# Patient Record
Sex: MALE | Employment: FULL TIME | URBAN - METROPOLITAN AREA
[De-identification: names, ages, dates, MRNs, and addresses within clinical notes are randomized per-mention and may not be internally consistent; named-entity substitution may affect disease eponyms.]

---

## 2023-12-11 ENCOUNTER — OFFICE VISIT (OUTPATIENT)
Dept: FAMILY MEDICINE CLINIC | Facility: CLINIC | Age: 53
End: 2023-12-11
Payer: COMMERCIAL

## 2023-12-11 VITALS
BODY MASS INDEX: 29.47 KG/M2 | SYSTOLIC BLOOD PRESSURE: 138 MMHG | HEIGHT: 76 IN | TEMPERATURE: 98 F | HEART RATE: 101 BPM | DIASTOLIC BLOOD PRESSURE: 80 MMHG | WEIGHT: 242 LBS | OXYGEN SATURATION: 97 %

## 2023-12-11 DIAGNOSIS — Z00.00 LABORATORY EXAM ORDERED AS PART OF ROUTINE GENERAL MEDICAL EXAMINATION: ICD-10-CM

## 2023-12-11 DIAGNOSIS — I48.91 ATRIAL FIBRILLATION WITH RVR (HCC): ICD-10-CM

## 2023-12-11 DIAGNOSIS — N52.9 ERECTILE DYSFUNCTION, UNSPECIFIED ERECTILE DYSFUNCTION TYPE: ICD-10-CM

## 2023-12-11 DIAGNOSIS — Z00.00 ANNUAL PHYSICAL EXAM: Primary | ICD-10-CM

## 2023-12-11 DIAGNOSIS — Z12.11 COLON CANCER SCREENING: ICD-10-CM

## 2023-12-11 DIAGNOSIS — R23.9 SKIN CHANGE: ICD-10-CM

## 2023-12-11 DIAGNOSIS — I49.9 IRREGULAR HEART BEAT: ICD-10-CM

## 2023-12-11 LAB
ATRIAL RATE: 241 BPM
Q-T INTERVAL: 370 MS
QRS DURATION: 80 MS
QTC CALCULATION (BEZET): 457 MS
R AXIS: 10 DEGREES
T AXIS: 30 DEGREES
VENTRICULAR RATE: 92 BPM

## 2023-12-11 RX ORDER — SILDENAFIL 100 MG/1
100 TABLET, FILM COATED ORAL
Qty: 30 TABLET | Refills: 1 | Status: SHIPPED | OUTPATIENT
Start: 2023-12-11

## 2023-12-14 ENCOUNTER — APPOINTMENT (OUTPATIENT)
Dept: LAB | Age: 53
End: 2023-12-14
Attending: FAMILY MEDICINE
Payer: COMMERCIAL

## 2023-12-27 ENCOUNTER — LAB ENCOUNTER (OUTPATIENT)
Dept: LAB | Age: 53
End: 2023-12-27
Attending: FAMILY MEDICINE
Payer: COMMERCIAL

## 2023-12-27 DIAGNOSIS — Z00.00 LABORATORY EXAM ORDERED AS PART OF ROUTINE GENERAL MEDICAL EXAMINATION: ICD-10-CM

## 2023-12-27 LAB
ALBUMIN SERPL-MCNC: 3.9 G/DL (ref 3.4–5)
ALBUMIN/GLOB SERPL: 1.1 {RATIO} (ref 1–2)
ALP LIVER SERPL-CCNC: 57 U/L
ALT SERPL-CCNC: 34 U/L
ANION GAP SERPL CALC-SCNC: 4 MMOL/L (ref 0–18)
AST SERPL-CCNC: 23 U/L (ref 15–37)
BILIRUB SERPL-MCNC: 0.4 MG/DL (ref 0.1–2)
BUN BLD-MCNC: 17 MG/DL (ref 9–23)
CALCIUM BLD-MCNC: 8.8 MG/DL (ref 8.5–10.1)
CHLORIDE SERPL-SCNC: 109 MMOL/L (ref 98–112)
CHOLEST SERPL-MCNC: 171 MG/DL (ref ?–200)
CO2 SERPL-SCNC: 26 MMOL/L (ref 21–32)
COMPLEXED PSA SERPL-MCNC: 3.39 NG/ML (ref ?–4)
CREAT BLD-MCNC: 1.36 MG/DL
EGFRCR SERPLBLD CKD-EPI 2021: 62 ML/MIN/1.73M2 (ref 60–?)
ERYTHROCYTE [DISTWIDTH] IN BLOOD BY AUTOMATED COUNT: 12.4 %
EST. AVERAGE GLUCOSE BLD GHB EST-MCNC: 111 MG/DL (ref 68–126)
FASTING PATIENT LIPID ANSWER: YES
FASTING STATUS PATIENT QL REPORTED: YES
GLOBULIN PLAS-MCNC: 3.5 G/DL (ref 2.8–4.4)
GLUCOSE BLD-MCNC: 102 MG/DL (ref 70–99)
HBA1C MFR BLD: 5.5 % (ref ?–5.7)
HCT VFR BLD AUTO: 47.3 %
HDLC SERPL-MCNC: 39 MG/DL (ref 40–59)
HGB BLD-MCNC: 16.5 G/DL
LDLC SERPL CALC-MCNC: 101 MG/DL (ref ?–100)
MCH RBC QN AUTO: 30.6 PG (ref 26–34)
MCHC RBC AUTO-ENTMCNC: 34.9 G/DL (ref 31–37)
MCV RBC AUTO: 87.8 FL
NONHDLC SERPL-MCNC: 132 MG/DL (ref ?–130)
OSMOLALITY SERPL CALC.SUM OF ELEC: 290 MOSM/KG (ref 275–295)
PLATELET # BLD AUTO: 268 10(3)UL (ref 150–450)
POTASSIUM SERPL-SCNC: 4.1 MMOL/L (ref 3.5–5.1)
PROT SERPL-MCNC: 7.4 G/DL (ref 6.4–8.2)
RBC # BLD AUTO: 5.39 X10(6)UL
SODIUM SERPL-SCNC: 139 MMOL/L (ref 136–145)
TRIGL SERPL-MCNC: 180 MG/DL (ref 30–149)
TSI SER-ACNC: 2.62 MIU/ML (ref 0.36–3.74)
VLDLC SERPL CALC-MCNC: 30 MG/DL (ref 0–30)
WBC # BLD AUTO: 7 X10(3) UL (ref 4–11)

## 2023-12-27 PROCEDURE — 80061 LIPID PANEL: CPT

## 2023-12-27 PROCEDURE — 84443 ASSAY THYROID STIM HORMONE: CPT

## 2023-12-27 PROCEDURE — 80053 COMPREHEN METABOLIC PANEL: CPT

## 2023-12-27 PROCEDURE — 36415 COLL VENOUS BLD VENIPUNCTURE: CPT

## 2023-12-27 PROCEDURE — 85027 COMPLETE CBC AUTOMATED: CPT

## 2023-12-27 PROCEDURE — 83036 HEMOGLOBIN GLYCOSYLATED A1C: CPT

## 2024-02-07 RX ORDER — FLECAINIDE ACETATE 100 MG/1
100 TABLET ORAL 2 TIMES DAILY
COMMUNITY

## 2024-02-07 RX ORDER — METOPROLOL SUCCINATE 50 MG/1
50 TABLET, EXTENDED RELEASE ORAL DAILY
Status: ON HOLD | COMMUNITY
End: 2024-02-13

## 2024-02-12 NOTE — PAT NURSING NOTE
Preprocedure Instructions     Pre-Procedure Instructions: Encouraged to check in electronically via [employer]     Visitor Instructions     Adult Patients: 2 Adult Care Partners can accompany the patient day of procedure. 2 Care Partners may visit [id number] during inpatient stay        PreOp Instructions     You are scheduled for: a Cardiac Procedure- Cardioversion with Dr. Umanzor     Date of Procedure: 02/13/24 Tuesday     Diet Instructions: Do not eat or drink anything after midnight     Medications: Medications you are allowed to take can be taken with a sip of water the morning of your procedure     Medications to Stop: Hold herbal supplements and vitamins on day of procedure     Other Medications: Do not take Viagra within 24 hours of procedure     Arrival Time: You will receive a call the afternoon before your procedure after 3 pm on what time you should arrive the day of your procedure    Driving After Procedure: If sedation is given, you WILL NOT be able to drive home. You will need a responsible adult  to drive you home., Cannot take uber or cab unless approved by physician     Discharge Teaching: Your nurse will give you specific instructions before discharge, Most people can resume normal activities in 2-3 days, Any questions, please call the physician's office      parking is available starting at 6 am or park in the [place] parking garage at [facility]. Check in at the [facility] reception desk. Our  will be there to check you in for your procedure. Please bring your insurance cards and ID with you.                                                                                                                                      Please DO NOT respond to this message, the inbasket is not monitored for messages. For any questions, please call the physician's office.

## 2024-02-13 ENCOUNTER — HOSPITAL ENCOUNTER (OUTPATIENT)
Dept: INTERVENTIONAL RADIOLOGY/VASCULAR | Facility: HOSPITAL | Age: 54
Discharge: HOME | End: 2024-02-13
Attending: INTERNAL MEDICINE | Admitting: INTERNAL MEDICINE
Payer: COMMERCIAL

## 2024-02-13 VITALS
HEIGHT: 76 IN | OXYGEN SATURATION: 95 % | BODY MASS INDEX: 29.22 KG/M2 | WEIGHT: 240 LBS | DIASTOLIC BLOOD PRESSURE: 90 MMHG | RESPIRATION RATE: 17 BRPM | TEMPERATURE: 98 F | HEART RATE: 53 BPM | SYSTOLIC BLOOD PRESSURE: 128 MMHG

## 2024-02-13 DIAGNOSIS — I48.91 A-FIB (HCC): ICD-10-CM

## 2024-02-13 PROCEDURE — 5A2204Z RESTORATION OF CARDIAC RHYTHM, SINGLE: ICD-10-PCS | Performed by: INTERNAL MEDICINE

## 2024-02-13 PROCEDURE — 92960 CARDIOVERSION ELECTRIC EXT: CPT | Performed by: INTERNAL MEDICINE

## 2024-02-13 PROCEDURE — 93005 ELECTROCARDIOGRAM TRACING: CPT

## 2024-02-13 PROCEDURE — 93010 ELECTROCARDIOGRAM REPORT: CPT | Performed by: INTERNAL MEDICINE

## 2024-02-13 RX ORDER — METHOHEXITAL IN WATER/PF 100MG/10ML
100 SYRINGE (ML) INTRAVENOUS ONCE
Status: DISCONTINUED | OUTPATIENT
Start: 2024-02-13 | End: 2024-02-13 | Stop reason: HOSPADM

## 2024-02-13 RX ORDER — SODIUM CHLORIDE 9 MG/ML
INJECTION, SOLUTION INTRAVENOUS CONTINUOUS
Status: DISCONTINUED | OUTPATIENT
Start: 2024-02-13 | End: 2024-02-13

## 2024-02-13 RX ORDER — METHOHEXITAL IN WATER/PF 100MG/10ML
SYRINGE (ML) INTRAVENOUS
Status: COMPLETED
Start: 2024-02-13 | End: 2024-02-13

## 2024-02-13 RX ORDER — METOPROLOL SUCCINATE 50 MG/1
25 TABLET, EXTENDED RELEASE ORAL DAILY
Qty: 30 TABLET | Refills: 0 | Status: SHIPPED | OUTPATIENT
Start: 2024-02-13

## 2024-02-13 RX ADMIN — SODIUM CHLORIDE: 9 INJECTION, SOLUTION INTRAVENOUS at 11:00:00

## 2024-02-13 RX ADMIN — METHOHEXITAL IN WATER/PF 60 MG: 100MG/10ML SYRINGE (ML) INTRAVENOUS at 11:55:00

## 2024-02-13 NOTE — PROGRESS NOTES
Successful cardioversion at bedside with Dr Umanzor. Pt tolerated procedure well. See bedside sedation record. Pt converted to SB  confirmed by EKG. Neuro intact, RICHARDS well. Pts wife at bedside.     12:45: Pt did not want anything to eat or drink. IV removed with catheter intact. Reviewed discharge instructions with pt and wife, verbalizes understanding. Home via w/c in stable condition without c/o. Pts wife is the .

## 2024-02-13 NOTE — H&P
[facility]  H&P    Dann Whitt Patient Status:  Outpatient    10/31/1970 MRN [id number]   Location [facility] INTERVENTIONAL SUITES Attending Vlad Umanzor MD   Hosp Day # 0 PCP Chris Pollard MD     Reason for Admission:  Pt presents for cardioversion.    Assessment/Plan:  Patient Active Problem List   Diagnosis    Pneumonia, organism unspecified(486)    Unspecified sinusitis (chronic)    Sciatica       Pt presents for cardioversion. Pt has been therapeutic anticoagulation for the appropriate amount of time, uninterrupted. Discussed risks, benefits and alternatives to sedation and cardioversion.     History of Present Illness:  Dann Whitt is a a(n) 53 year old male. Presents for cardioversion.    History:  Past Medical History:   Diagnosis Date    Arrhythmia      Past Surgical History:   Procedure Laterality Date    ANESTH,KNEE JOINT,CLOSED PROCEDURE Right 2003    bursa removed    HAND/FINGER SURGERY UNLISTED  1989    left bone graft (pinky finger)    OTHER SURGICAL HISTORY  2001    corneal lasik     Family History   Problem Relation Age of Onset    Diabetes Mother     Colon Cancer Father     No Known Problems Sister     No Known Problems Brother     No Known Problems Brother       reports that he has quit smoking. His smoking use included cigarettes. He has never used smokeless tobacco. He reports current alcohol use. He reports current drug use. Drug: Cannabis.    Allergies:  No Active Allergies    Medications:    Current Facility-Administered Medications:     sodium chloride 0.9% infusion, , Intravenous, Continuous    methohexital (BrevITAL) 100 mg/10mL IV syringe 100 mg, 100 mg, Intravenous, Once    Review of Systems:  All systems were reviewed and are negative except as described above in HPI.    Physical Exam:  Blood pressure (!) 155/107, pulse 70, temperature 98.2 °F (36.8 °C), temperature source Temporal, resp. rate 14, height 76\", weight  240 lb (108.9 kg), SpO2 96%.  Temp (24hrs), Av.2 °F (36.8 °C), Min:98.2 °F (36.8 °C), Max:98.2 °F (36.8 °C)    Wt Readings from Last 3 Encounters:   24 240 lb (108.9 kg)   23 242 lb (109.8 kg)         General: Alert and oriented in no apparent distress.  HEENT: No focal deficits.  Neck: No JVD, carotids 2+ no bruits.  Cardiac: Regular rate and rhythm, S1, S2 normal, no murmur, rub or gallop.  Lungs: Clear without wheezes, rales, rhonchi or dullness.  Normal excursions and effort.  Abdomen: Soft, non-tender.   Extremities: Without clubbing, cyanosis or edema.  Peripheral pulses are 2+.  Neurologic: Alert and oriented, normal affect.  Skin: Warm and dry.     Laboratories and Data:  Diagnostics:    Labs:        No results found for: \"PT\", \"INR\"      Vlad Umanzor MD  2024  11:52 AM    Vlad Umanzor MD  [facility]  Cardiac Electrophysiolgy

## 2024-02-13 NOTE — DISCHARGE INSTRUCTIONS
HOME CARE INSTRUCTIONS FOLLOWING CARDIOVERSION OR ICD EVALUATION    ACTIVITY:  - DO NOT drive after the procedure. You may resume driving after 24 hours.   - DO NOT operate any heavy machinery for 24 hours (Including kitchen appliances or power tools)   - Avoid drinking alcohol for 24 hours.  - Rest today and resume your normal activities tomorrow.    WHAT IS NORMAL:   -Your skin may be red where the patches were placed.  -The redness may last 2-3 days and feel like \"Sunburn\"  -You may treat the area with an over-the- counter cream that would be used for sunburned skin.    SPECIAL INSTRUCTIONS:  -IF you are taking the medication Xarelto or Coumadin or other blood thinning medication, it is very important that you continue taking until your follow up appointment with you physician.    OTHER:  -You may resume your present diet and medications unless otherwise specified by you physician.  -A list of medications was provided to you at discharge.  - Decrease Metoprolol to 25mg a day.   ~ Follow up with Dr Umanzor on 3/18 at 2:00PM

## 2024-02-13 NOTE — PROCEDURES
PROCEDURE(S) PERFORMED:    1.    Cardioversion.  2.     Sedation 60    :  Vlad Beckham MD     ANESTHESIA:  IV sedation.     PRE-Op  Persistent atrial fibrillation.  POST-Op Sinus rhythm  COMPLICATIONS:  None.     METHODS:  The patient was brought to the outpatient cardiac telemetry unit in a fasting and nonsedated state after providing informed consent.  IV sedation was administered during continuous ECG, pulse oximeter and noninvasive hemodynamic monitoring.  After administering a total of 60 mg of IV brevital in intermittent boluses, the desired level of sedation was achieved.  A single 360-joule synchronized biphasic shock was delivered with successful conversion to sinus rhythm. The patient remained on telemetry until she was fully recovered.  There were no complications.     CONCLUSIONS:  1.    Successful cardioversion.     ________________________________  SEAMUS BECKHAM MD

## 2024-02-14 LAB
ATRIAL RATE: 56 BPM
P AXIS: 64 DEGREES
P-R INTERVAL: 174 MS
Q-T INTERVAL: 462 MS
QRS DURATION: 84 MS
QTC CALCULATION (BEZET): 445 MS
R AXIS: 49 DEGREES
T AXIS: 61 DEGREES
VENTRICULAR RATE: 56 BPM

## 2024-04-18 RX ORDER — SOTALOL HYDROCHLORIDE 80 MG/1
80 TABLET ORAL 2 TIMES DAILY
COMMUNITY
End: 2024-04-23

## 2024-04-22 NOTE — PAT NURSING NOTE
Patient denies missing any doses of Xarelto in the last month.    PreOp Instructions     You are scheduled for: a Cardiac Procedure     Date of Procedure: 04/23/24 Tuesday     Diet Instructions: Do not eat or drink anything after midnight     Medications: Medications you are allowed to take can be taken with a sip of water the morning of your procedure     Medications to Stop: Hold herbal supplements and vitamins on day of procedure     Other Medications: Hold/do NOT take Viagra/Sildenafil within 24 hours of procedure.      Do NOT miss any doses of Xarelto prior to Cardioversion, please notify office for any missed doses.     Arrival Time: 11:00 am    Driving After Procedure: If sedation is given, you WILL NOT be able to drive home. You will need a responsible adult  to drive you home., Cannot take uber or cab unless approved by physician     Discharge Teaching: Your nurse will give you specific instructions before discharge, Most people can resume normal activities in 2-3 days, Any questions, please call the physician's office     [employer] parking is available starting at 6 am or park in the [place] parking garage at [facility]. Check in at the [facility] reception desk. Our  will be there to check you in for your procedure. Please bring your insurance cards and ID with you.                                                                                                                                      Please DO NOT respond to this message, the inbasket is not monitored for messages. For any questions, please call the physician's office.

## 2024-04-23 ENCOUNTER — TELEPHONE (OUTPATIENT)
Dept: FAMILY MEDICINE CLINIC | Facility: CLINIC | Age: 54
End: 2024-04-23

## 2024-04-23 ENCOUNTER — HOSPITAL ENCOUNTER (OUTPATIENT)
Dept: INTERVENTIONAL RADIOLOGY/VASCULAR | Facility: HOSPITAL | Age: 54
Discharge: HOME | End: 2024-04-23
Attending: INTERNAL MEDICINE | Admitting: INTERNAL MEDICINE
Payer: COMMERCIAL

## 2024-04-23 VITALS
HEART RATE: 61 BPM | TEMPERATURE: 97 F | HEIGHT: 76 IN | DIASTOLIC BLOOD PRESSURE: 87 MMHG | RESPIRATION RATE: 18 BRPM | OXYGEN SATURATION: 95 % | WEIGHT: 240 LBS | SYSTOLIC BLOOD PRESSURE: 131 MMHG | BODY MASS INDEX: 29.22 KG/M2

## 2024-04-23 DIAGNOSIS — I48.91 A-FIB (HCC): ICD-10-CM

## 2024-04-23 LAB
ATRIAL RATE: 234 BPM
Q-T INTERVAL: 422 MS
QRS DURATION: 76 MS
QTC CALCULATION (BEZET): 486 MS
R AXIS: 19 DEGREES
T AXIS: 25 DEGREES
VENTRICULAR RATE: 80 BPM

## 2024-04-23 PROCEDURE — 93010 ELECTROCARDIOGRAM REPORT: CPT | Performed by: INTERNAL MEDICINE

## 2024-04-23 PROCEDURE — 93005 ELECTROCARDIOGRAM TRACING: CPT

## 2024-04-23 PROCEDURE — 92960 CARDIOVERSION ELECTRIC EXT: CPT | Performed by: INTERNAL MEDICINE

## 2024-04-23 PROCEDURE — 5A2204Z RESTORATION OF CARDIAC RHYTHM, SINGLE: ICD-10-PCS | Performed by: INTERNAL MEDICINE

## 2024-04-23 RX ORDER — METHOHEXITAL IN WATER/PF 100MG/10ML
100 SYRINGE (ML) INTRAVENOUS ONCE
Status: COMPLETED | OUTPATIENT
Start: 2024-04-23 | End: 2024-04-23

## 2024-04-23 RX ORDER — METHOHEXITAL IN WATER/PF 100MG/10ML
SYRINGE (ML) INTRAVENOUS
Status: COMPLETED
Start: 2024-04-23 | End: 2024-04-23

## 2024-04-23 RX ORDER — SODIUM CHLORIDE 9 MG/ML
INJECTION, SOLUTION INTRAVENOUS CONTINUOUS
Status: DISCONTINUED | OUTPATIENT
Start: 2024-04-23 | End: 2024-04-23

## 2024-04-23 RX ADMIN — METHOHEXITAL IN WATER/PF 70 MG: 100MG/10ML SYRINGE (ML) INTRAVENOUS at 12:00:00

## 2024-04-23 NOTE — DISCHARGE INSTRUCTIONS
HOME CARE INSTRUCTIONS FOLLOWING CARDIOVERSION OR ICD EVALUATION      Activity:  - DO NOT drive after the procedure. You may resume driving after 24 hours.  - DO NOT operate any machinery (including kitchen appliances or power tools).  - Avoid drinking alcohol for 24 hours.  - You may resume your normal activity after 24 hours.    What is Normal:  - Your skin may be red where the patches were placed.  - The redness may last 2 to 3 days and feel like a \"sunburn\".  - You may treat the area with an over-the-counter cream that is used for sunburned skin.    Special Instructions:  - If you were taking the medication Eliquis, Xarelto, Pradaxa or Coumadin, it is very important to continue taking it until your follow-up appointment with your physician.    When you should NOTIFY YOUR PHYSICIAN:  - If you feel that you have returned to an irregular rhythm  - If you have an ICD, any time your ICD device fires    Other:  - You may resume your present diet, unless otherwise specified by your physician.  - You may resume all of your medications as prescribed, unless otherwise directed by your physician.  - A list of your medications was provided to you at discharge.  - Please call your physician's office for a follow-up appointment. You should be seen in 2 weeks.

## 2024-04-23 NOTE — H&P
[facility]  H&P    Dann Whitt Patient Status:  Outpatient    10/31/1970 MRN [id number]   Location [facility] INTERVENTIONAL SUITES Attending Vlad Umanzor MD   Hosp Day # 0 PCP Chris Pollard MD     Reason for Admission:  Pt presents for cardioversion.    Assessment/Plan:  Patient Active Problem List   Diagnosis    Pneumonia, organism unspecified(486)    Unspecified sinusitis (chronic)    Sciatica       Pt presents for cardioversion. Pt has been therapeutic anticoagulation for the appropriate amount of time, uninterrupted. Discussed risks, benefits and alternatives to sedation and cardioversion.     History of Present Illness:  Dann Whitt is a a(n) 53 year old male. Presents for cardioversion.    History:  Past Medical History:    Arrhythmia     Past Surgical History:   Procedure Laterality Date    Anesth,knee joint,closed procedure Right     bursa removed    Hand/finger surgery unlisted  1989    left bone graft (pinky finger)    Other surgical history  2001    corneal lasik     Family History   Problem Relation Age of Onset    Diabetes Mother     Colon Cancer Father     No Known Problems Sister     No Known Problems Brother     No Known Problems Brother       reports that he has quit smoking. His smoking use included cigarettes. He has never used smokeless tobacco. He reports current alcohol use. He reports current drug use. Drug: Cannabis.    Allergies:  No Active Allergies    Medications:    Current Facility-Administered Medications:     sodium chloride 0.9% infusion, , Intravenous, Continuous    methohexital (BrevITAL) 100 mg/10mL IV syringe 100 mg, 100 mg, Intravenous, Once    Review of Systems:  All systems were reviewed and are negative except as described above in HPI.    Physical Exam:  Blood pressure 134/89, pulse 73, temperature 97 °F (36.1 °C), temperature source Temporal, resp. rate 21, height 76\", weight 240 lb (108.9 kg), SpO2  96%.  Temp (24hrs), Av °F (36.1 °C), Min:97 °F (36.1 °C), Max:97 °F (36.1 °C)    Wt Readings from Last 3 Encounters:   24 240 lb (108.9 kg)   24 240 lb (108.9 kg)   23 242 lb (109.8 kg)         General: Alert and oriented in no apparent distress.  HEENT: No focal deficits.  Neck: No JVD, carotids 2+ no bruits.  Cardiac: Regular rate and rhythm, S1, S2 normal, no murmur, rub or gallop.  Lungs: Clear without wheezes, rales, rhonchi or dullness.  Normal excursions and effort.  Abdomen: Soft, non-tender.   Extremities: Without clubbing, cyanosis or edema.  Peripheral pulses are 2+.  Neurologic: Alert and oriented, normal affect.  Skin: Warm and dry.     Laboratories and Data:  Diagnostics:    Labs:        No results found for: \"PT\", \"INR\"      Vlad Umanzor MD  2024  12:12 PM    Vlad Umanzor MD  [facility]  Cardiac Electrophysiolgy

## 2024-04-23 NOTE — PROCEDURES
PROCEDURE(S) PERFORMED:    1.    Cardioversion.  2.     Sedation     :  Vlad Beckham MD     ANESTHESIA:  IV sedation.     PRE-Op  Persistent atrial fibrillation.  POST-Op Sinus rhythm  COMPLICATIONS:  None.     METHODS:  The patient was brought to the outpatient cardiac telemetry unit in a fasting and nonsedated state after providing informed consent.  IV sedation was administered during continuous ECG, pulse oximeter and noninvasive hemodynamic monitoring.  After administering a total of 70 mg of IV brevital in intermittent boluses, the desired level of sedation was achieved.  A single 360-joule synchronized biphasic shock was delivered with successful conversion to sinus rhythm. The patient remained on telemetry until she was fully recovered.  There were no complications.     CONCLUSIONS:  1.    Successful cardioversion.     ________________________________  SEAMUS BECKHAM MD

## 2024-04-23 NOTE — PROGRESS NOTES
Pt post cardioversion  TO at 1208- after adequate sedation per Dr. Umanzor, attempt to cardiovert x 2 at 360j. Pt converted to NSR for a short time then converted back to afib. Vss. Pt tolerated well.    Pt awake.     Recovery complete per protocol. Vss. Discharge instructions reviewed, iv dc;'d and pt discharged home with wife driving.

## 2024-04-24 NOTE — TELEPHONE ENCOUNTER
Pt bite tongue yesterday, bleeding has stopped but area swollen and painful. Small area on tongue has a flap  present but not bleeding.  Advised salt water gargle, soft foods, sucking  on ice, Tylenol and continue to monitor.

## 2024-06-04 ENCOUNTER — ORDER TRANSCRIPTION (OUTPATIENT)
Dept: ADMINISTRATIVE | Facility: HOSPITAL | Age: 54
End: 2024-06-04

## 2024-06-04 DIAGNOSIS — I48.91 ATRIAL FIBRILLATION WITH RVR (HCC): Primary | ICD-10-CM

## 2024-06-05 ENCOUNTER — TELEPHONE (OUTPATIENT)
Dept: CARDIOLOGY UNIT | Facility: HOSPITAL | Age: 54
End: 2024-06-05

## 2024-06-05 NOTE — PROGRESS NOTES
Reviewed [facility] stress echo with Dr. ZARIA Wei, no further work up indicated for cardiac clearance.  Amanda Avina RN  Clinical Coordinator  CV [facility]

## 2024-06-11 ENCOUNTER — HOSPITAL ENCOUNTER (OUTPATIENT)
Dept: GENERAL RADIOLOGY | Age: 54
Discharge: HOME | End: 2024-06-11
Attending: SURGERY
Payer: COMMERCIAL

## 2024-06-11 DIAGNOSIS — Z01.818 PRE-OP TESTING: ICD-10-CM

## 2024-06-11 DIAGNOSIS — I48.19 PERSISTENT ATRIAL FIBRILLATION (HCC): ICD-10-CM

## 2024-06-11 PROCEDURE — 71046 X-RAY EXAM CHEST 2 VIEWS: CPT | Performed by: SURGERY

## 2024-06-14 NOTE — IMAGING NOTE
0843- Pt called and LVM instructing to arrive 15 minutes prior to CTA gated study on 6/18/24, park in the [facility] parking garage, eat a light breakfast/lunch, hydrate, hold caffeine/decaf/chocolate for 12 hours prior, and take all medications as prescribed.  Provided radiology RN callback number if pt has any questions.

## 2024-06-18 ENCOUNTER — HOSPITAL ENCOUNTER (OUTPATIENT)
Dept: CT IMAGING | Facility: HOSPITAL | Age: 54
Discharge: HOME | End: 2024-06-18
Attending: INTERNAL MEDICINE

## 2024-06-18 ENCOUNTER — LAB ENCOUNTER (OUTPATIENT)
Dept: LAB | Facility: HOSPITAL | Age: 54
End: 2024-06-18
Attending: SURGERY

## 2024-06-18 ENCOUNTER — EKG ENCOUNTER (OUTPATIENT)
Dept: LAB | Facility: HOSPITAL | Age: 54
End: 2024-06-18
Attending: SURGERY

## 2024-06-18 VITALS — HEART RATE: 89 BPM | SYSTOLIC BLOOD PRESSURE: 125 MMHG | DIASTOLIC BLOOD PRESSURE: 87 MMHG

## 2024-06-18 DIAGNOSIS — I48.91 ATRIAL FIBRILLATION WITH RVR (HCC): ICD-10-CM

## 2024-06-18 DIAGNOSIS — Z01.818 PRE-OP TESTING: ICD-10-CM

## 2024-06-18 DIAGNOSIS — I48.19 PERSISTENT ATRIAL FIBRILLATION (HCC): ICD-10-CM

## 2024-06-18 DIAGNOSIS — Z91.89 AT RISK FOR BLEEDING: ICD-10-CM

## 2024-06-18 LAB
ALBUMIN SERPL-MCNC: 3.8 G/DL (ref 3.4–5)
ALBUMIN/GLOB SERPL: 1.1 {RATIO} (ref 1–2)
ALP LIVER SERPL-CCNC: 52 U/L
ALT SERPL-CCNC: 26 U/L
ANION GAP SERPL CALC-SCNC: 5 MMOL/L (ref 0–18)
ANTIBODY SCREEN: NEGATIVE
APTT PPP: 41.3 SECONDS (ref 23–36)
AST SERPL-CCNC: 19 U/L (ref 15–37)
ATRIAL RATE: 288 BPM
BASOPHILS # BLD AUTO: 0.07 X10(3) UL (ref 0–0.2)
BASOPHILS NFR BLD AUTO: 1.2 %
BILIRUB SERPL-MCNC: 0.5 MG/DL (ref 0.1–2)
BILIRUB UR QL STRIP.AUTO: NEGATIVE
BUN BLD-MCNC: 16 MG/DL (ref 9–23)
CALCIUM BLD-MCNC: 8.6 MG/DL (ref 8.5–10.1)
CHLORIDE SERPL-SCNC: 115 MMOL/L (ref 98–112)
CLARITY UR REFRACT.AUTO: CLEAR
CO2 SERPL-SCNC: 22 MMOL/L (ref 21–32)
COLOR UR AUTO: YELLOW
CREAT BLD-MCNC: 1.06 MG/DL
CREAT BLD-MCNC: 1.1 MG/DL
EGFRCR SERPLBLD CKD-EPI 2021: 80 ML/MIN/1.73M2 (ref 60–?)
EGFRCR SERPLBLD CKD-EPI 2021: 83 ML/MIN/1.73M2 (ref 60–?)
EOSINOPHIL # BLD AUTO: 0.13 X10(3) UL (ref 0–0.7)
EOSINOPHIL NFR BLD AUTO: 2.1 %
ERYTHROCYTE [DISTWIDTH] IN BLOOD BY AUTOMATED COUNT: 12.5 %
EST. AVERAGE GLUCOSE BLD GHB EST-MCNC: 105 MG/DL (ref 68–126)
FASTING STATUS PATIENT QL REPORTED: YES
GLOBULIN PLAS-MCNC: 3.4 G/DL (ref 2.8–4.4)
GLUCOSE BLD-MCNC: 90 MG/DL (ref 70–99)
GLUCOSE UR STRIP.AUTO-MCNC: NORMAL MG/DL
HBA1C MFR BLD: 5.3 % (ref ?–5.7)
HCT VFR BLD AUTO: 43.3 %
HGB BLD-MCNC: 15.3 G/DL
IMM GRANULOCYTES # BLD AUTO: 0.03 X10(3) UL (ref 0–1)
IMM GRANULOCYTES NFR BLD: 0.5 %
INR BLD: 1.57 (ref 0.8–1.2)
KETONES UR STRIP.AUTO-MCNC: NEGATIVE MG/DL
LEUKOCYTE ESTERASE UR QL STRIP.AUTO: NEGATIVE
LYMPHOCYTES # BLD AUTO: 1.71 X10(3) UL (ref 1–4)
LYMPHOCYTES NFR BLD AUTO: 28.2 %
MCH RBC QN AUTO: 30.7 PG (ref 26–34)
MCHC RBC AUTO-ENTMCNC: 35.3 G/DL (ref 31–37)
MCV RBC AUTO: 86.9 FL
MONOCYTES # BLD AUTO: 0.62 X10(3) UL (ref 0.1–1)
MONOCYTES NFR BLD AUTO: 10.2 %
NEUTROPHILS # BLD AUTO: 3.5 X10 (3) UL (ref 1.5–7.7)
NEUTROPHILS # BLD AUTO: 3.5 X10(3) UL (ref 1.5–7.7)
NEUTROPHILS NFR BLD AUTO: 57.8 %
NITRITE UR QL STRIP.AUTO: NEGATIVE
OSMOLALITY SERPL CALC.SUM OF ELEC: 295 MOSM/KG (ref 275–295)
PH UR STRIP.AUTO: 5.5 [PH] (ref 5–8)
PLATELET # BLD AUTO: 261 10(3)UL (ref 150–450)
POTASSIUM SERPL-SCNC: 4.1 MMOL/L (ref 3.5–5.1)
PROT SERPL-MCNC: 7.2 G/DL (ref 6.4–8.2)
PROTHROMBIN TIME: 18.9 SECONDS (ref 11.6–14.8)
Q-T INTERVAL: 366 MS
QRS DURATION: 78 MS
QTC CALCULATION (BEZET): 467 MS
R AXIS: 12 DEGREES
RBC # BLD AUTO: 4.98 X10(6)UL
RBC UR QL AUTO: NEGATIVE
RH BLOOD TYPE: POSITIVE
SODIUM SERPL-SCNC: 142 MMOL/L (ref 136–145)
SP GR UR STRIP.AUTO: 1.03 (ref 1–1.03)
T AXIS: 13 DEGREES
UROBILINOGEN UR STRIP.AUTO-MCNC: NORMAL MG/DL
VENTRICULAR RATE: 98 BPM
WBC # BLD AUTO: 6.1 X10(3) UL (ref 4–11)

## 2024-06-18 PROCEDURE — 86901 BLOOD TYPING SEROLOGIC RH(D): CPT

## 2024-06-18 PROCEDURE — 36415 COLL VENOUS BLD VENIPUNCTURE: CPT

## 2024-06-18 PROCEDURE — 81003 URINALYSIS AUTO W/O SCOPE: CPT

## 2024-06-18 PROCEDURE — 86900 BLOOD TYPING SEROLOGIC ABO: CPT

## 2024-06-18 PROCEDURE — 83036 HEMOGLOBIN GLYCOSYLATED A1C: CPT

## 2024-06-18 PROCEDURE — 80053 COMPREHEN METABOLIC PANEL: CPT

## 2024-06-18 PROCEDURE — 85610 PROTHROMBIN TIME: CPT

## 2024-06-18 PROCEDURE — 82565 ASSAY OF CREATININE: CPT

## 2024-06-18 PROCEDURE — 85025 COMPLETE CBC W/AUTO DIFF WBC: CPT

## 2024-06-18 PROCEDURE — 75572 CT HRT W/3D IMAGE: CPT | Performed by: INTERNAL MEDICINE

## 2024-06-18 PROCEDURE — 86850 RBC ANTIBODY SCREEN: CPT

## 2024-06-18 PROCEDURE — 85730 THROMBOPLASTIN TIME PARTIAL: CPT

## 2024-06-18 PROCEDURE — 93010 ELECTROCARDIOGRAM REPORT: CPT | Performed by: INTERNAL MEDICINE

## 2024-06-18 PROCEDURE — 93005 ELECTROCARDIOGRAM TRACING: CPT

## 2024-06-18 NOTE — IMAGING NOTE
Pt scheduled for CT gated pulmonary vein  Denies any allergies to contrast.  Scanned in CT room # 4  HR 89 during scan at 1131  0.9  ml given  IV contrast 70 ml given  Tolerated exam well. Instructed to drink water.

## 2024-07-02 NOTE — PAT NURSING NOTE
PAT nursing note: denies taking ASA, ACEs, ARBs, diabetic or weight loss medications; no PPM, ICD or nerve/spinal cord stimulator; patient/spouse verbalized understanding of all instructions.      Pre-Surgical Instructions for 7/9/24 with Dr. Wei       Visitor Instructions    -2 visitors are welcome to accompany you the day of surgery.   -Visiting hours are 7:00 am-8:00 pm.      Pre-Op Instructions    Scheduled Surgery: You are scheduled for Cardiac Surgery with Dr. Wei      Date of Procedure: Tuesday, 07/09/24      TENTATIVE time of arrival: 05:30 am     -This is going to be a tentative time of arrival for surgery.   -We will call you the buisness day prior to your surgery in the late afternoon to reconfirm your arrival.   -Please check your voicemail for a message.    -Park in the [place] parking garage at [facility].    -Check in at the [facility] reception desk in the [facility].  -Our  will be there to check you in for your procedure.   -[email or web address] parking is available starting at 6:00 am.    -Leave all valuables at home, including jewelry.      Diet Instructions:     -Do not eat or drink after 10:00 pm. This includes water, gum, candy and food.      Medication Instructions:     CONTINUE to take your prescribed medications as directed EXCEPT:    -The morning of surgery ONLY take your Metoprolol with a sip of water.        Medications to Stop:     -The last dose of Cannabis: smoke and edibles 7/1.    -The last dose of Xarelto will be Wednesday, 7/3.    -The last dose of Sildenafil will be Friday, 7/5.    -The last dose of vitamins, supplements, and NSAID's (ex. Ibuprofen, Excederin, Aleve, Diclofenac, and any gels having steroids) will be Monday, 7/1.         Skin Prep:     -Shower with Dial Soap the morning of your surgery (or any antibacterial soap).       Admit/Discharge Status:     -You will be admitted to the hospital after surgery.    If you are scheduled to  arrive early for 5:30 am, the [facility] reception desk does not open till 5:30 am. It may be dark, but you are in the correct location.     We are open M-F from 8am- 5pm. We are closed on holidays and weekends. We can be reached at [phone].         Thank you

## 2024-07-09 ENCOUNTER — ANESTHESIA (OUTPATIENT)
Dept: CARDIAC SURGERY | Facility: HOSPITAL | Age: 54
End: 2024-07-09
Payer: COMMERCIAL

## 2024-07-09 ENCOUNTER — APPOINTMENT (OUTPATIENT)
Dept: GENERAL RADIOLOGY | Facility: HOSPITAL | Age: 54
End: 2024-07-09
Attending: SURGERY
Payer: COMMERCIAL

## 2024-07-09 ENCOUNTER — HOSPITAL ENCOUNTER (INPATIENT)
Facility: HOSPITAL | Age: 54
LOS: 3 days | Discharge: HOME | End: 2024-07-12
Attending: SURGERY | Admitting: SURGERY
Payer: COMMERCIAL

## 2024-07-09 ENCOUNTER — ANESTHESIA EVENT (OUTPATIENT)
Dept: CARDIAC SURGERY | Facility: HOSPITAL | Age: 54
End: 2024-07-09
Payer: COMMERCIAL

## 2024-07-09 ENCOUNTER — APPOINTMENT (OUTPATIENT)
Dept: GENERAL RADIOLOGY | Facility: HOSPITAL | Age: 54
End: 2024-07-09
Attending: PHYSICIAN ASSISTANT
Payer: COMMERCIAL

## 2024-07-09 DIAGNOSIS — G89.18 POST-OP PAIN: ICD-10-CM

## 2024-07-09 DIAGNOSIS — I48.19 PERSISTENT ATRIAL FIBRILLATION (HCC): Primary | ICD-10-CM

## 2024-07-09 DIAGNOSIS — Z01.818 PRE-OP TESTING: ICD-10-CM

## 2024-07-09 DIAGNOSIS — Z91.89 AT RISK FOR BLEEDING: ICD-10-CM

## 2024-07-09 LAB
BASE EXCESS BLD CALC-SCNC: -6 MMOL/L
BASE EXCESS BLDA CALC-SCNC: -9.3 MMOL/L (ref ?–2)
BODY TEMPERATURE: 98.6 F
CA-I BLD-SCNC: 1.16 MMOL/L (ref 0.95–1.32)
CA-I BLD-SCNC: 1.24 MMOL/L (ref 1.12–1.32)
CO2 BLD-SCNC: 22 MMOL/L (ref 22–32)
COHGB MFR BLD: 1.6 % SAT (ref 0–3)
GLUCOSE BLD-MCNC: 112 MG/DL (ref 70–99)
HCO3 BLD-SCNC: 21.1 MEQ/L
HCO3 BLDA-SCNC: 17.7 MEQ/L (ref 21–27)
HCT VFR BLD CALC: 37 %
HGB BLD-MCNC: 14.7 G/DL
ISTAT ACTIVATED CLOTTING TIME: 140 SECONDS (ref 74–137)
L/M: 10 L/MIN
LACTATE BLD-SCNC: 3 MMOL/L (ref 0.5–2)
METHGB MFR BLD: 0.3 % SAT (ref 0.4–1.5)
MRSA DNA SPEC QL NAA+PROBE: NEGATIVE
OXYHGB MFR BLDA: 97.1 % (ref 92–100)
PCO2 BLD: 45.4 MMHG
PCO2 BLDA: 34 MM HG (ref 35–45)
PH BLD: 7.27 [PH]
PH BLDA: 7.29 [PH] (ref 7.35–7.45)
PO2 BLD: 450 MMHG
PO2 BLDA: 91 MM HG (ref 80–100)
POTASSIUM BLD-SCNC: 3.6 MMOL/L (ref 3.6–5.1)
POTASSIUM BLD-SCNC: 4.1 MMOL/L (ref 3.6–5.1)
RH BLOOD TYPE: POSITIVE
SAO2 % BLD: 100 %
SODIUM BLD-SCNC: 136 MMOL/L (ref 135–145)
SODIUM BLD-SCNC: 142 MMOL/L (ref 136–145)

## 2024-07-09 PROCEDURE — 84132 ASSAY OF SERUM POTASSIUM: CPT | Performed by: INTERNAL MEDICINE

## 2024-07-09 PROCEDURE — 87641 MR-STAPH DNA AMP PROBE: CPT | Performed by: SURGERY

## 2024-07-09 PROCEDURE — 02584ZZ DESTRUCTION OF CONDUCTION MECHANISM, PERCUTANEOUS ENDOSCOPIC APPROACH: ICD-10-PCS | Performed by: SURGERY

## 2024-07-09 PROCEDURE — 71045 X-RAY EXAM CHEST 1 VIEW: CPT | Performed by: SURGERY

## 2024-07-09 PROCEDURE — 3E0T3BZ INTRODUCTION OF ANESTHETIC AGENT INTO PERIPHERAL NERVES AND PLEXI, PERCUTANEOUS APPROACH: ICD-10-PCS | Performed by: SURGERY

## 2024-07-09 PROCEDURE — 82330 ASSAY OF CALCIUM: CPT | Performed by: INTERNAL MEDICINE

## 2024-07-09 PROCEDURE — 85347 COAGULATION TIME ACTIVATED: CPT

## 2024-07-09 PROCEDURE — 83605 ASSAY OF LACTIC ACID: CPT | Performed by: INTERNAL MEDICINE

## 2024-07-09 PROCEDURE — 02K84ZZ MAP CONDUCTION MECHANISM, PERCUTANEOUS ENDOSCOPIC APPROACH: ICD-10-PCS | Performed by: SURGERY

## 2024-07-09 PROCEDURE — 93312 ECHO TRANSESOPHAGEAL: CPT | Performed by: INTERNAL MEDICINE

## 2024-07-09 PROCEDURE — 82375 ASSAY CARBOXYHB QUANT: CPT | Performed by: INTERNAL MEDICINE

## 2024-07-09 PROCEDURE — 82330 ASSAY OF CALCIUM: CPT

## 2024-07-09 PROCEDURE — 84295 ASSAY OF SERUM SODIUM: CPT | Performed by: INTERNAL MEDICINE

## 2024-07-09 PROCEDURE — 71045 X-RAY EXAM CHEST 1 VIEW: CPT | Performed by: PHYSICIAN ASSISTANT

## 2024-07-09 PROCEDURE — 3E033RZ INTRODUCTION OF ANTIARRHYTHMIC INTO PERIPHERAL VEIN, PERCUTANEOUS APPROACH: ICD-10-PCS | Performed by: SURGERY

## 2024-07-09 PROCEDURE — 84295 ASSAY OF SERUM SODIUM: CPT

## 2024-07-09 PROCEDURE — 82803 BLOOD GASES ANY COMBINATION: CPT | Performed by: INTERNAL MEDICINE

## 2024-07-09 PROCEDURE — 85018 HEMOGLOBIN: CPT | Performed by: INTERNAL MEDICINE

## 2024-07-09 PROCEDURE — B24BZZ4 ULTRASONOGRAPHY OF HEART WITH AORTA, TRANSESOPHAGEAL: ICD-10-PCS | Performed by: SURGERY

## 2024-07-09 PROCEDURE — 84132 ASSAY OF SERUM POTASSIUM: CPT

## 2024-07-09 PROCEDURE — 83050 HGB METHEMOGLOBIN QUAN: CPT | Performed by: INTERNAL MEDICINE

## 2024-07-09 PROCEDURE — 82803 BLOOD GASES ANY COMBINATION: CPT

## 2024-07-09 PROCEDURE — 85014 HEMATOCRIT: CPT

## 2024-07-09 PROCEDURE — 02L74CK OCCLUSION OF LEFT ATRIAL APPENDAGE WITH EXTRALUMINAL DEVICE, PERCUTANEOUS ENDOSCOPIC APPROACH: ICD-10-PCS | Performed by: SURGERY

## 2024-07-09 DEVICE — LAA EXCLUSION SYSTEM, PRO240
Type: IMPLANTABLE DEVICE | Site: HEART | Status: FUNCTIONAL
Brand: ATRICLIP® PRO2 GILLINOV-COSGROVE LAA EXCLUSION SYSTEM

## 2024-07-09 RX ORDER — HYDROMORPHONE HYDROCHLORIDE 1 MG/ML
0.8 INJECTION, SOLUTION INTRAMUSCULAR; INTRAVENOUS; SUBCUTANEOUS EVERY 2 HOUR PRN
Status: DISCONTINUED | OUTPATIENT
Start: 2024-07-09 | End: 2024-07-12

## 2024-07-09 RX ORDER — NALOXONE HYDROCHLORIDE 0.4 MG/ML
0.08 INJECTION, SOLUTION INTRAMUSCULAR; INTRAVENOUS; SUBCUTANEOUS AS NEEDED
Status: ACTIVE | OUTPATIENT
Start: 2024-07-09 | End: 2024-07-09

## 2024-07-09 RX ORDER — METOPROLOL TARTRATE 1 MG/ML
INJECTION, SOLUTION INTRAVENOUS AS NEEDED
Status: DISCONTINUED | OUTPATIENT
Start: 2024-07-09 | End: 2024-07-09 | Stop reason: SURG

## 2024-07-09 RX ORDER — PROCHLORPERAZINE EDISYLATE 5 MG/ML
5 INJECTION INTRAMUSCULAR; INTRAVENOUS EVERY 8 HOURS PRN
Status: DISCONTINUED | OUTPATIENT
Start: 2024-07-09 | End: 2024-07-09

## 2024-07-09 RX ORDER — METHYLPREDNISOLONE 4 MG/1
4 TABLET ORAL
Status: DISCONTINUED | OUTPATIENT
Start: 2024-07-13 | End: 2024-07-12

## 2024-07-09 RX ORDER — METOPROLOL SUCCINATE 50 MG/1
50 TABLET, EXTENDED RELEASE ORAL
Status: DISCONTINUED | OUTPATIENT
Start: 2024-07-09 | End: 2024-07-11

## 2024-07-09 RX ORDER — METHYLPREDNISOLONE 4 MG/1
4 TABLET ORAL
Status: DISCONTINUED | OUTPATIENT
Start: 2024-07-12 | End: 2024-07-12

## 2024-07-09 RX ORDER — LIDOCAINE HYDROCHLORIDE 10 MG/ML
INJECTION, SOLUTION EPIDURAL; INFILTRATION; INTRACAUDAL; PERINEURAL AS NEEDED
Status: DISCONTINUED | OUTPATIENT
Start: 2024-07-09 | End: 2024-07-09 | Stop reason: SURG

## 2024-07-09 RX ORDER — BUPIVACAINE HYDROCHLORIDE AND EPINEPHRINE 2.5; 5 MG/ML; UG/ML
INJECTION, SOLUTION EPIDURAL; INFILTRATION; INTRACAUDAL; PERINEURAL AS NEEDED
Status: DISCONTINUED | OUTPATIENT
Start: 2024-07-09 | End: 2024-07-12

## 2024-07-09 RX ORDER — METHYLPREDNISOLONE 4 MG/1
4 TABLET ORAL NIGHTLY
Status: DISCONTINUED | OUTPATIENT
Start: 2024-07-13 | End: 2024-07-12

## 2024-07-09 RX ORDER — METHYLPREDNISOLONE 4 MG/1
4 TABLET ORAL
Status: COMPLETED | OUTPATIENT
Start: 2024-07-12 | End: 2024-07-12

## 2024-07-09 RX ORDER — METHYLPREDNISOLONE 4 MG/1
4 TABLET ORAL
Status: COMPLETED | OUTPATIENT
Start: 2024-07-10 | End: 2024-07-10

## 2024-07-09 RX ORDER — MIDAZOLAM HYDROCHLORIDE 1 MG/ML
1 INJECTION INTRAMUSCULAR; INTRAVENOUS EVERY 5 MIN PRN
Status: ACTIVE | OUTPATIENT
Start: 2024-07-09 | End: 2024-07-09

## 2024-07-09 RX ORDER — DIPHENHYDRAMINE HYDROCHLORIDE 50 MG/ML
12.5 INJECTION INTRAMUSCULAR; INTRAVENOUS AS NEEDED
Status: ACTIVE | OUTPATIENT
Start: 2024-07-09 | End: 2024-07-09

## 2024-07-09 RX ORDER — ONDANSETRON 2 MG/ML
4 INJECTION INTRAMUSCULAR; INTRAVENOUS EVERY 6 HOURS PRN
Status: DISCONTINUED | OUTPATIENT
Start: 2024-07-09 | End: 2024-07-09

## 2024-07-09 RX ORDER — ONDANSETRON 2 MG/ML
INJECTION INTRAMUSCULAR; INTRAVENOUS AS NEEDED
Status: DISCONTINUED | OUTPATIENT
Start: 2024-07-09 | End: 2024-07-09 | Stop reason: SURG

## 2024-07-09 RX ORDER — METHYLPREDNISOLONE 4 MG/1
4 TABLET ORAL
Status: COMPLETED | OUTPATIENT
Start: 2024-07-11 | End: 2024-07-11

## 2024-07-09 RX ORDER — MIDAZOLAM HYDROCHLORIDE 1 MG/ML
INJECTION INTRAMUSCULAR; INTRAVENOUS AS NEEDED
Status: DISCONTINUED | OUTPATIENT
Start: 2024-07-09 | End: 2024-07-09 | Stop reason: SURG

## 2024-07-09 RX ORDER — DEXAMETHASONE SODIUM PHOSPHATE 4 MG/ML
VIAL (ML) INJECTION AS NEEDED
Status: DISCONTINUED | OUTPATIENT
Start: 2024-07-09 | End: 2024-07-09 | Stop reason: SURG

## 2024-07-09 RX ORDER — OXYCODONE HYDROCHLORIDE 10 MG/1
10 TABLET ORAL EVERY 4 HOURS PRN
Status: DISCONTINUED | OUTPATIENT
Start: 2024-07-09 | End: 2024-07-12

## 2024-07-09 RX ORDER — METHYLPREDNISOLONE 4 MG/1
4 TABLET ORAL
Status: DISCONTINUED | OUTPATIENT
Start: 2024-07-14 | End: 2024-07-12

## 2024-07-09 RX ORDER — SODIUM CHLORIDE 9 MG/ML
INJECTION, SOLUTION INTRAVENOUS CONTINUOUS
Status: DISCONTINUED | OUTPATIENT
Start: 2024-07-09 | End: 2024-07-10

## 2024-07-09 RX ORDER — HYDROMORPHONE HYDROCHLORIDE 1 MG/ML
0.4 INJECTION, SOLUTION INTRAMUSCULAR; INTRAVENOUS; SUBCUTANEOUS EVERY 2 HOUR PRN
Status: DISCONTINUED | OUTPATIENT
Start: 2024-07-09 | End: 2024-07-12

## 2024-07-09 RX ORDER — OXYCODONE HYDROCHLORIDE 5 MG/1
5 TABLET ORAL EVERY 4 HOURS PRN
Status: DISCONTINUED | OUTPATIENT
Start: 2024-07-09 | End: 2024-07-12

## 2024-07-09 RX ORDER — METHYLPREDNISOLONE 4 MG/1
8 TABLET ORAL
Status: COMPLETED | OUTPATIENT
Start: 2024-07-10 | End: 2024-07-10

## 2024-07-09 RX ORDER — COLCHICINE 0.6 MG/1
0.6 TABLET ORAL DAILY
Status: DISCONTINUED | OUTPATIENT
Start: 2024-07-09 | End: 2024-07-12

## 2024-07-09 RX ORDER — HEPARIN SODIUM 1000 [USP'U]/ML
INJECTION, SOLUTION INTRAVENOUS; SUBCUTANEOUS AS NEEDED
Status: DISCONTINUED | OUTPATIENT
Start: 2024-07-09 | End: 2024-07-09 | Stop reason: SURG

## 2024-07-09 RX ORDER — ESMOLOL HYDROCHLORIDE 10 MG/ML
INJECTION INTRAVENOUS AS NEEDED
Status: DISCONTINUED | OUTPATIENT
Start: 2024-07-09 | End: 2024-07-09 | Stop reason: SURG

## 2024-07-09 RX ORDER — SODIUM CHLORIDE 9 MG/ML
INJECTION, SOLUTION INTRAVENOUS CONTINUOUS PRN
Status: DISCONTINUED | OUTPATIENT
Start: 2024-07-09 | End: 2024-07-09 | Stop reason: SURG

## 2024-07-09 RX ORDER — PROCHLORPERAZINE EDISYLATE 5 MG/ML
5 INJECTION INTRAMUSCULAR; INTRAVENOUS EVERY 8 HOURS PRN
Status: DISCONTINUED | OUTPATIENT
Start: 2024-07-09 | End: 2024-07-12

## 2024-07-09 RX ORDER — ROCURONIUM BROMIDE 10 MG/ML
INJECTION, SOLUTION INTRAVENOUS AS NEEDED
Status: DISCONTINUED | OUTPATIENT
Start: 2024-07-09 | End: 2024-07-09 | Stop reason: SURG

## 2024-07-09 RX ORDER — HYDROMORPHONE HYDROCHLORIDE 1 MG/ML
0.2 INJECTION, SOLUTION INTRAMUSCULAR; INTRAVENOUS; SUBCUTANEOUS EVERY 5 MIN PRN
Status: ACTIVE | OUTPATIENT
Start: 2024-07-09 | End: 2024-07-09

## 2024-07-09 RX ORDER — HYDROMORPHONE HYDROCHLORIDE 1 MG/ML
0.6 INJECTION, SOLUTION INTRAMUSCULAR; INTRAVENOUS; SUBCUTANEOUS EVERY 5 MIN PRN
Status: ACTIVE | OUTPATIENT
Start: 2024-07-09 | End: 2024-07-09

## 2024-07-09 RX ORDER — KETOROLAC TROMETHAMINE 15 MG/ML
15 INJECTION, SOLUTION INTRAMUSCULAR; INTRAVENOUS EVERY 6 HOURS
Status: COMPLETED | OUTPATIENT
Start: 2024-07-09 | End: 2024-07-11

## 2024-07-09 RX ORDER — SODIUM CHLORIDE, SODIUM LACTATE, POTASSIUM CHLORIDE, CALCIUM CHLORIDE 600; 310; 30; 20 MG/100ML; MG/100ML; MG/100ML; MG/100ML
INJECTION, SOLUTION INTRAVENOUS CONTINUOUS
Status: DISCONTINUED | OUTPATIENT
Start: 2024-07-09 | End: 2024-07-10

## 2024-07-09 RX ORDER — HYDROMORPHONE HYDROCHLORIDE 1 MG/ML
0.4 INJECTION, SOLUTION INTRAMUSCULAR; INTRAVENOUS; SUBCUTANEOUS EVERY 5 MIN PRN
Status: DISPENSED | OUTPATIENT
Start: 2024-07-09 | End: 2024-07-09

## 2024-07-09 RX ORDER — PHENYLEPHRINE HCL 10 MG/ML
VIAL (ML) INJECTION AS NEEDED
Status: DISCONTINUED | OUTPATIENT
Start: 2024-07-09 | End: 2024-07-09 | Stop reason: SURG

## 2024-07-09 RX ORDER — HEPARIN SODIUM AND DEXTROSE 10000; 5 [USP'U]/100ML; G/100ML
500 INJECTION INTRAVENOUS CONTINUOUS
Status: DISCONTINUED | OUTPATIENT
Start: 2024-07-09 | End: 2024-07-10

## 2024-07-09 RX ORDER — ONDANSETRON 2 MG/ML
4 INJECTION INTRAMUSCULAR; INTRAVENOUS EVERY 6 HOURS PRN
Status: DISCONTINUED | OUTPATIENT
Start: 2024-07-09 | End: 2024-07-12

## 2024-07-09 RX ORDER — MEPERIDINE HYDROCHLORIDE 25 MG/ML
INJECTION INTRAMUSCULAR; INTRAVENOUS; SUBCUTANEOUS
Status: COMPLETED
Start: 2024-07-09 | End: 2024-07-09

## 2024-07-09 RX ORDER — ACETAMINOPHEN 500 MG
1000 TABLET ORAL EVERY 8 HOURS
Status: DISCONTINUED | OUTPATIENT
Start: 2024-07-09 | End: 2024-07-12

## 2024-07-09 RX ORDER — METHYLPREDNISOLONE 4 MG/1
4 TABLET ORAL
Status: COMPLETED | OUTPATIENT
Start: 2024-07-09 | End: 2024-07-09

## 2024-07-09 RX ORDER — MEPERIDINE HYDROCHLORIDE 25 MG/ML
12.5 INJECTION INTRAMUSCULAR; INTRAVENOUS; SUBCUTANEOUS AS NEEDED
Status: COMPLETED | OUTPATIENT
Start: 2024-07-09 | End: 2024-07-09

## 2024-07-09 RX ORDER — METHYLPREDNISOLONE 4 MG/1
8 TABLET ORAL
Status: COMPLETED | OUTPATIENT
Start: 2024-07-09 | End: 2024-07-09

## 2024-07-09 RX ORDER — ACETAMINOPHEN 10 MG/ML
INJECTION, SOLUTION INTRAVENOUS AS NEEDED
Status: DISCONTINUED | OUTPATIENT
Start: 2024-07-09 | End: 2024-07-09 | Stop reason: SURG

## 2024-07-09 RX ADMIN — PHENYLEPHRINE HCL 100 MCG: 10 MG/ML VIAL (ML) INJECTION at 09:51:00

## 2024-07-09 RX ADMIN — ACETAMINOPHEN 1000 MG: 10 INJECTION, SOLUTION INTRAVENOUS at 09:01:00

## 2024-07-09 RX ADMIN — ROCURONIUM BROMIDE 70 MG: 10 INJECTION, SOLUTION INTRAVENOUS at 06:58:00

## 2024-07-09 RX ADMIN — DEXAMETHASONE SODIUM PHOSPHATE 4 MG: 4 MG/ML VIAL (ML) INJECTION at 07:02:00

## 2024-07-09 RX ADMIN — ONDANSETRON 4 MG: 2 INJECTION INTRAMUSCULAR; INTRAVENOUS at 10:40:00

## 2024-07-09 RX ADMIN — HEPARIN SODIUM 5000 UNITS: 1000 INJECTION, SOLUTION INTRAVENOUS; SUBCUTANEOUS at 08:27:00

## 2024-07-09 RX ADMIN — PHENYLEPHRINE HCL 100 MCG: 10 MG/ML VIAL (ML) INJECTION at 10:17:00

## 2024-07-09 RX ADMIN — ROCURONIUM BROMIDE 20 MG: 10 INJECTION, SOLUTION INTRAVENOUS at 08:25:00

## 2024-07-09 RX ADMIN — LIDOCAINE HYDROCHLORIDE 50 MG: 10 INJECTION, SOLUTION EPIDURAL; INFILTRATION; INTRACAUDAL; PERINEURAL at 06:58:00

## 2024-07-09 RX ADMIN — MIDAZOLAM HYDROCHLORIDE 2 MG: 1 INJECTION INTRAMUSCULAR; INTRAVENOUS at 06:53:00

## 2024-07-09 RX ADMIN — ROCURONIUM BROMIDE 20 MG: 10 INJECTION, SOLUTION INTRAVENOUS at 09:47:00

## 2024-07-09 RX ADMIN — SODIUM CHLORIDE: 9 INJECTION, SOLUTION INTRAVENOUS at 06:53:00

## 2024-07-09 RX ADMIN — METOPROLOL TARTRATE 1 MG: 1 INJECTION, SOLUTION INTRAVENOUS at 07:12:00

## 2024-07-09 RX ADMIN — ESMOLOL HYDROCHLORIDE 20 MG: 10 INJECTION INTRAVENOUS at 10:00:00

## 2024-07-09 RX ADMIN — ESMOLOL HYDROCHLORIDE 20 MG: 10 INJECTION INTRAVENOUS at 06:58:00

## 2024-07-09 NOTE — H&P
Updated H&P    53M with chronic persistent atrial fibrillation presents for sub-xiphoid convergent MAZE and left VATS left atrial appendage clip. No changes since clinic visit.    Skip Wei      H&P  Mr. Whitt is a 53-year-old gentleman who started getting worked up at the end of last fall for general health due to his dad dealing with his own health issues.  Prior to presenting for that workup the patient had noticed increasing shortness of breath with activity going up a flight or two of stairs or if he pushed himself too hard going too fast on his walks.  Other than that, he is still able to walk a few miles a day.  He hasn't developed any new lightheadedness or syncope episodes.  He also denies any chest pain.  Prior to that, he had not been on any medications and had no past medical history other than he quit smoking about five years ago.  He does have a pretty extensive family history of atrial fibrillation with his dad, uncle, and aunt all having chronic atrial fibrillation.  The patient was found to be in atrial fibrillation a little over six months ago.  He underwent a stress test in December that was normal.  He subsequently underwent a TTE in January that showed an EF down to 50% consistent with some heart failure, trace tricuspid regurgitation, trace mitral regurgitation, root measuring 3.1 cm, an ascending measuring   3.3 cm, and a normal aortic valve.  The left atrium is mildly dilated at 4.4 cm and the right atrium is moderately dilated at 5.1 cm.      Due to his worsening symptoms and subsequent episodes of atrial fibrillation with RVR, the patient underwent cardioversion in February of 2024 with immediate conversion back to atrial fibrillation and then he attempted another conversion just a few ago on April 23rd which only got him into a regular rhythm for 5-10 minutes before he was back in atrial fibrillation.  The patient has also undergone home monitoring which showed that he is chronically  in atrial fibrillation with a burden of over 99% of the time with rates as fast as 190s.  The patient on further questioning states he has noticed the shortness of breath for almost two to three years, progressively worsening, but he just thought that it was due to some after effect from Covid.  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  The patient is currently on Xarelto.  He also has a history of hemorrhoids so he occasionally fills the toilet bowl with blood if he bears down too hard.  The patient is on metoprolol.  He was on flecainide for months but that subsequently failed.  He also did a trial of sotalol prior to the last ablation and that also failed.  The patient has had CHADS2 score of 1 with just under 1% risk of stroke for a year.  He would like to seek a nonpharmacological treatment alternative due to persistently being in atrial fibrillation, the worsening severity of his symptoms, and in order to try and get off blood thinners with his bleeding issues as well as with his hobby and prior occupation of being a ridley.  The patient is willing to take blood thinners but it adamantly opposed to being on blood thinners chronically or these medications chronically if at all possible.  He has not undergone any ablations in the past.  For all these reasons, Doctor mUanzor, the patient's electrophysiologist, has referred him for evaluation for possible convergent ablation.       Past medical history is notable for symptomatic chronic persistent atrial fibrillation for which he failed flecainide, is still on metoprolol, failed a trial of sotalol, and is also on Xarelto.  The patient also failed two cardioversions in February and April of this year.  He socially has maybe one drink a week.  He is a former smoker quitting over five years ago.  The patient had left pinky surgery.  He denies heart attack, stroke, diabetes, kidney disease, liver  disease, hypertension, hyperlipidemia, peptic ulcer disease, lung disease, cancer, or vein stripping.  As mentioned, his dad, uncle, and aunt all have chronic persistent atrial fibrillation.  The patient also has hemorrhoids and has been having some bleeding issues ever since he has been on Xarelto.  The patient has an EF consistent with at least mild CHF.  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       The patient has no known drug allergies.       On review of systems, the patient has been having progressively worsening shortness of breath for a few years now.  He has been diagnosed with atrial fibrillation for over six months and has failed two aggressive antiarrhythmic medications.  The patient occasionally feels his heart racing.  He denies feeling chest pain.  He doesn't get any lightheadedness, dizziness, and hasn't notice any leg swelling.  He denies fevers, chills, nausea, vomiting, abdominal pain, new vision changes, new gross motor sensory deficits, or unintentional weight loss.  As mentioned, he has been getting some bloody filling of the toilet bowl and says that it is not just streaking but actually filling the toilet bowl when he bears down too much with his hemorrhoids.       On physical examination, this is a healthy younger than stated age 53-year-old male who is 6'4” tall and weighs 248 pounds.  BMI is 30.2.  BSA is 2.48 m2.  Eyes are equal and reactive to light.  There is no scleral icterus.  There is no jaundice.  The patient has a normal affect; humor intact.  Gross motor sensory is intact.  Lungs are clear to auscultation bilaterally with nonlabored breathing on room air.  Cardiac examination shows a normal S1 and S2 with an irregularly irregular rate and rhythm; no murmur noted.  Bilateral lower extremities are without edema or rashes.         Creatinine is consistent with mild chronic kidney disease at 1.36, total bilirubin 0.4, LFTs otherwise normal,  albumen 3.9, white blood cell count is 7, hematocrit 47.3, hemoglobin 16.5, and platelets 268,000.       I had a lengthy conversation with Mr. Whitt and his wife today regarding his current cardiac condition of symptomatic chronically persistent atrial fibrillation.  I discussed with the patient that after failing multiple antiarrhythmics, having worsening symptoms, as well as bleeding issues, the various options for managing the atrial fibrillation and his blood thinners.  I discussed with the patient his current treatment options which could include continued medical management, versus possible ablation alone, versus possible hybrid approach via the convergent procedure with subxiphoid, left atrial posterior wall ablation, and left VATS AtriClip followed by possible intravascular ablation a month or two later versus the possibility of performing an open complete ablation on cardiopulmonary bypass.  I discussed with the patient that based on his current symptom level it might be a bit aggressive to go straight to a completely open approach due to that additional risk.  However, I do think he would be an excellent candidate for the convergent procedure if he deemed that to be his best option.  The patient, as mentioned, would love to be able to get off blood thinners and also have an improved quality of life going forward.  I discussed with the patient the various success rates of the convergent procedure, primarily that at 18 months there is about a 63% success rate compared to catheter ablation alone being about 43% and then at one year it is about a 94% success rate of getting less than 5% atrial fibrillation burden.  We also discussed the risks of the operation including pericardial and pleural effusion, pneumonia, bleeding, stroke, heart attack, death, and infection.  I also reiterated to the patient the importance of waiting 3-12 months prior to knowing how successful the operation would be as it takes that  long for the scarring to set in.       The patient is leaning toward proceeding with this procedure and will look at his schedule to pick a time that would best fit his other plans.  All of the patient's questions and concerns were addressed and we are in agreement with the plan going forward.       Thank you for involving us in the consultation of your patient.       Sincerely,     Skip Wei

## 2024-07-09 NOTE — ANESTHESIA PROCEDURE NOTES
Airway  Date/Time: 7/9/2024 6:58 AM  Urgency: elective      General Information and Staff    Patient location during procedure: OR  Anesthesiologist: Harry Orozco MD  Performed: anesthesiologist   Performed by: Harry Orozco MD  Authorized by: Harry Orozco MD      Indications and Patient Condition  Indications for airway management: anesthesia  Sedation level: deep  Preoxygenated: yes  Patient position: sniffing  Mask difficulty assessment: 1 - vent by mask    Final Airway Details  Final airway type: endotracheal airway      Successful airway: ETT and ETT - double lumen left  Cuffed: yes   Successful intubation technique: Video laryngoscopy  Endotracheal tube insertion site: oral  Blade: GlideScope  ETT DL size (fr): 41    Placement verified by: capnometry   Measured from: lips  Number of attempts at approach: 1    Additional Comments  Atraumatic, ZAHRA positioned with FOB

## 2024-07-09 NOTE — ANESTHESIA PREPROCEDURE EVALUATION
PRE-OP EVALUATION    Patient Name: Dann Whitt    Admit Diagnosis: persistant atrial fibrillation    Pre-op Diagnosis: persistant atrial fibrillation    CONVERGENT PROCEDURE    Anesthesia Procedure: CONVERGENT PROCEDURE    Surgeons and Role:     * Skip Wei MD - Primary    Pre-op vitals reviewed.        Body mass index is 29.21 kg/m².    Current medications reviewed.  Hospital Medications:   mupirocin (Bactroban) 2% nasal ointment 1 Application  1 Application Nasal Once    [START ON 7/10/2024] ceFAZolin (Ancef) 2g in 10mL IV syringe premix  2 g Intravenous On Call       Outpatient Medications:     Medications Prior to Admission   Medication Sig Dispense Refill Last Dose    metoprolol succinate ER 50 MG Oral Tablet 24 Hr Take 0.5 tablets (25 mg total) by mouth daily. (Patient taking differently: Take 1 tablet (50 mg total) by mouth daily.) 30 tablet 0 7/8/2024    NON FORMULARY Take 1 tablet by mouth daily. B root supplement daily, fruit/vegetable supplement vitamin daily   7/3/2024    rivaroxaban (XARELTO) 20 MG Oral Tab Take 1 tablet (20 mg total) by mouth daily with food.   7/3/2024    Sildenafil Citrate 100 MG Oral Tab Take 1 tablet (100 mg total) by mouth daily as needed for Erectile Dysfunction. 30 tablet 1 Past Month       Allergies: Patient has no active allergies.      Anesthesia Evaluation    Patient summary reviewed.    Anesthetic Complications  (-) history of anesthetic complications         GI/Hepatic/Renal    Negative GI/hepatic/renal ROS.                             Cardiovascular                                    (+) dysrhythmias and atrial fibrillation                  Endo/Other    Negative endo/other ROS.                              Pulmonary               (+) shortness of breath            Neuro/Psych                 (+) neuromuscular disease                     Past Surgical History:   Procedure Laterality Date    Anesth,knee joint,closed procedure Right 2003    bursa removed     Hand/finger surgery unlisted  01/01/1989    left bone graft (pinky finger)    Other surgical history  11/01/2001    corneal lasik     Social History     Socioeconomic History    Marital status:    Tobacco Use    Smoking status: Former     Types: Cigarettes    Smokeless tobacco: Never    Tobacco comments:     6 cigs/daily   Vaping Use    Vaping status: Never Used   Substance and Sexual Activity    Alcohol use: Yes     Comment: ocassional    Drug use: Yes     Types: Cannabis     Comment: rare   Other Topics Concern    Caffeine Concern Yes    Exercise Yes     History   Drug Use    Types: Cannabis     Comment: rare     Available pre-op labs reviewed.  Lab Results   Component Value Date    WBC 6.1 06/18/2024    RBC 4.98 06/18/2024    HGB 15.3 06/18/2024    HCT 43.3 06/18/2024    MCV 86.9 06/18/2024    MCH 30.7 06/18/2024    MCHC 35.3 06/18/2024    RDW 12.5 06/18/2024    .0 06/18/2024     Lab Results   Component Value Date     06/18/2024    K 4.1 06/18/2024     (H) 06/18/2024    CO2 22.0 06/18/2024    BUN 16 06/18/2024    CREATSERUM 1.06 06/18/2024    GLU 90 06/18/2024    CA 8.6 06/18/2024     Lab Results   Component Value Date    INR 1.57 (H) 06/18/2024         Airway      Mallampati: III  Mouth opening: 3 FB  TM distance: 4 - 6 cm   Cardiovascular             Dental  Comment: No loose teeth per patient               Pulmonary                     Other findings              ASA: 3   Plan: general  NPO status verified and     Post-procedure pain management plan discussed with surgeon and patient.    Comment: GETA/LMA discussed in detail.  Risk of complications discussed including but not limited to sore throat, cough, PONV discussed. Also, discussed risks including dental injury particularly on any weakened, treated or diseased teeth & pt wishes to proceed  All questions answered.    Plan/risks discussed with: patient  Use of blood product(s) discussed with: patient    Consented to blood  products.          Present on Admission:  **None**

## 2024-07-09 NOTE — HISTORICAL OFFICE NOTE
Facility Logo [facility]  [address], 4th [place] [address]  [phone]      Dann Whitt  Progress Note  Demographics:  Name: Dann Whitt YOB: 1970  Age: 53, Male Medical Record No: [id number]  Visited Date/Time: 06/03/2024 03:10 PM    Chief Complaints  1 month follow up  History of Present Illness  54 y/o male presented to his PCP for a routine check up and was noted to be in AF.    He is a ridley, works at [employer] in [place]     He feels occasional heart racing with moderate exertion, denies chest pain, dizziness or syncope.   Cardiac risk factors Former smoker  Past Medical History  1.Racing heart beat  2.Atrial fibrillation with RVR  Family History  1. Father - Cancer; Unspecified atrial fibrillation  2. Mother - DM (diabetes mellitus) Type 2  3. Brother - No history of Unspecified atrial fibrillation  Social History  Smoking status Former smoker  Tobacco usage - No (Non-smoker for personal reasons (finding))  Review of systems  Cardiovascular No history of Chest pain, CASTILLO, Palpitations, Syncope, PND, Orthopnea, Edema and Claudication  Physical Examination  Vitals Right Arm Sitting  / 88 mmHg, Pulse rate 83 bpm, Regular, Height in 6' 0\", BMI: 33.8, Weight in 249.4 lbs (or) 113.13 kgs and BSA : 2.43 cc/m²  General Appearance No Acute Distress  Head/Eyes/Ears/Nose/Mouth/Throat Conjunctiva pink, Sclera Clear and Mucous membranes Moist  Neck Normal carotid pulsations, No carotid bruits and No JVD  Respiratory Unlabored, Lungs clear with normal breath sounds and Equal bilaterally  Cardiovascular Intact distal pulses and Regular rhythm. Normal rate present. Normal and normal S1 and S2    Allergies  1.Benadryl \"Brand Name (BN)\" [RxNorm:029429](Reaction:Hives [Snomed:[id number]], Severity:Severe)  Medications  1.metoprolol succinate ER 50 mg tablet,extended release 24 hr, Take 1 tablet orally once a day.  2.sotaloL 80 mg tablet, START  the day before the cardioversion- Take 1 tablet orally 2 times a day.  3.Xarelto 20 mg tablet, Take 1 tablet orally once a day.  Impression  1.Racing heart beat  2.Atrial fibrillation with RVR  Assessment & Plan  1. PAF- unknown duration, rates slightly elevated. Echo and stress are normal. MCT shows AF continuously with mildly elevated rates. He is status post DCCV, AF has recurred. Flecainide failed, similarly sotalol did not work.   2. History of COVID in 2020.     - Continue xarelto 20 mg QD  - Continue metoprolol, stop sotalol.   - He is scheduled for CONVERGENT, Atriclip in July  - CTA of pulmonary veins with Ca scoring.   - Follow up post CONVERGENT          Lifestyle Modification: Maintain normal BMI, Low Sodium diet. Increase Physical activity.    Increased BMI: Provide patient with information regarding diet and lifestyle changes.  Labs and Diagnostics ordered  1.EKG (electrocardiogram) (Today)  Miscellaneous  1.Weight monitoring (regime/therapy)  Nurses documentation  Triage - Nursing Doc:  Upcoming surgeries: no  Use of assistive devices(s): no  Triage & medication list reviewed by: RANULFO  EKG: no  Refills: no  Patient instructions  Stop sotalol  Schedule a CTA of the pulmonary veins with calcium score  Follow up with Dr. Umanzor post CONVERGENT  Diagnostics Details  Trans Thoracic Echocardiogram 01/08/2024  1.The study quality is good.    2.The left ventricle is normal in size and wall thickness. Global left ventricular systolic function is low normal. The left ventricular ejection fraction is 50%. Left ventricular diastolic function is indeterminate. No regional wall motion abnormalities.    3.The right ventricle is normal in size. Right ventricular systolic function is borderline normal.    4.The estimated pulmonary artery systolic pressure is 22 mmHg assuming a right atrial pressure of 3 mmHg.    5.The left atrium is mildly dilated measuring 4.4 cms.    6.The right atrium is moderately dilated measuring  5.1 cms.    Exercise Stress Echocardiogram 12/22/2023  1.Echocardiogram showed normal left ventricle. Global left ventricular systolic function is normal. During peak exercise all myocardial segments exhibited appropriate increase in contractility and thickness. Normal left ventricular systolic function is noted.    2.Combined results of normal stress EKG and Normal Stress Echocardiogram rules out stress induced wall motion abnormality and hence ischemia.    3.The study quality is good.    ACTMonitoring 12/18/2023  1.This is an excellent quality study.    2.Predominant rhythm is atrial flutter.    3.The minimum heart rate recorded was 48 beats / minute. The maximum heart rate is 192 beats / minute. The mean heart rate is 93 beats / minute.    4.Premature atrial contractions noted.    5.Afib [place] of >99%. Avg rate is 93 bpm, peak rate of 192 bpm suggests sub optimal rate control.    CPOE Orders carried out by: Alee Worthy  Care Providers: Vlad Umanzor MD, Alee Worthy  Electronically Authenticated by  Vlad Umanzor MD  06/05/2024 05:36:40 PM  Disclaimer: Components of this note were documented using voice recognition system and are subject to errors not corrected at proofreading. Contact the author of this note for any clarifications.

## 2024-07-09 NOTE — ANESTHESIA PROCEDURE NOTES
Arterial Line    Date/Time: 7/9/2024 7:13 AM    Performed by: Harry Orozco MD  Authorized by: Harry Orozco MD    General Information and Staff    Procedure Start:  7/9/2024 7:13 AM  Procedure End:  7/9/2024 7:13 AM  Anesthesiologist:  Harry Orozco MD  Performed By:  Anesthesiologist  Patient Location:  OR  Indication: continuous blood pressure monitoring and blood sampling needed    Site Identification: real time ultrasound guided and image stored and retrievable    Preanesthetic Checklist: 2 patient identifiers, IV checked, risks and benefits discussed, monitors and equipment checked, pre-op evaluation, timeout performed, anesthesia consent and sterile technique used    Procedure Details    Catheter Size:  20 G  Catheter Length:  1 and 3/4 inch  Catheter Type:  Arrow  Seldinger Technique?: Yes    Laterality:  Right  Site:  Radial artery  Site Prep: chlorhexidine    Line Secured:  Wrist Brace, tape and Tegaderm    Assessment    Events: patient tolerated procedure well with no complications      Medications  7/9/2024 7:13 AM      Additional Comments

## 2024-07-09 NOTE — ANESTHESIA PROCEDURE NOTES
Procedure Performed: CHAD       Start Time:  7/9/2024 7:34 AM       End Time:   7/9/2024 10:45 AM    Preanesthesia Checklist:  Patient identified, IV assessed, risks and benefits discussed, monitors and equipment assessed, procedure being performed at surgeon's request and anesthesia consent obtained.    General Procedure Information  Diagnostic Indications for Echo:  assessment of ascending aorta  Physician Requesting Echo: Skip Wei MD  Location performed:  OR  Intubated  Bite block placed  Heart visualized  Probe Insertion:  Easy  Probe Type:  Multiplane  Modalities:  2D only, color flow mapping, pulse wave Doppler and continuous wave Doppler    Echocardiographic and Doppler Measurements    Ventricles    Right Ventricle:  Cavity size dilated.  Hypertrophy not present.  Thrombus not present.  Global function normal.    Left Ventricle:  Cavity size normal.  Hypertrophy not present.  Thrombus not present.  Global Function normal.      Ventricular Regional Function:  1- Basal Anteroseptal:  normal  2- Basal Anterior:  normal  3- Basal Anterolateral:  normal  4- Basal Inferolateral:  normal  5- Basal Inferior:  normal  6- Basal Inferoseptal:  normal  7- Mid Anteroseptal:  normal  8- Mid Anterior:  normal  9- Mid Anterolateral:  normal  10- Mid Inferolateral:  normal  11- Mid Inferior:  normal  12- Mid Inferoseptal:  normal  13- Apical Anterior:  normal  14- Apical Lateral:  normal  15- Apical Inferior:  normal  16- Apical Septal:  normal  17- [place]:  normal      Valves    Aortic Valve:  Annulus normal.  Stenosis not present.  Regurgitation absent.  Leaflets normal.  Leaflet motions normal.      Mitral Valve:  Annulus normal.  Stenosis not present.  Regurgitation +1.  Leaflet motions normal.      Tricuspid Valve:  Annulus normal.  Stenosis moderate.  Leaflets normal.  Leaflet motions normal.        Aorta    Ascending Aorta:  Size normal.  Dissection not present.  Plaque thickness less than 3 mm.  Mobile plaque  not present.    Descending Aorta:  Size normal.  Dissection not present.  Plaque thickness less than 3 mm.  Mobile plaque not present.        Atria    Right Atrium:  Size normal.  Spontaneous echo contrast not present.  Thrombus not present.  Tumor not present.  Device not present.      Left Atrium:  Size dilated.  Spontaneous echo contrast not present.  Thrombus not present.  Device not present.    Left atrial appendage normal.      Septa    Atrial Septum:  Intra-atrial septal morphology normal.      Ventricular Septum:  Intra-ventricular septum morphology normal.              Anesthesia Information  Performed Personally  Anesthesiologist:  Harry Orozco MD      Post  Post Intervention Follow-up Study:No Change  Ventricular FXN:  Global FXN: Unchanged   Regional FXN: Unchanged  Valve FXN:  Native Valve:No change              Summary: No clot seen in LA appendage, velocity >40 m/s, patient in a fib  Complications:None

## 2024-07-09 NOTE — OPERATIVE REPORT
DATE OF PROCEDURE: 7/9/2024    PREOPERATIVE DIAGNOSIS:  Chronic persistent atrial fibrillation on xarelto  Hemorrhoids  History of cardioversion  History of COVID    POSTOPERATIVE DIAGNOSIS:  Same     PROCEDURES:    1.) Transesophageal echocardiogram  2.) Sub xiphoid posterior left atrial wall ablation  3.) Left video assist thoracoscopy  4.) Ligament of Matt resection  5.) Left atrial appendage clip with size 40 mm Atriclip Pro2  6.) Ultrasound guided right CFV and left CFA line insertion  7.) Left intercostal nerve block using 0.25% bupivicane with epinephrine    SURGEON: Skip Wei MD  PA: Greer Esqueda PA-C     The PA helped with camera driving, managing the ablation device, exposure, following sutures and closing the incisions.    ANESTHESIA: General endotracheal anesthesia with dual lumen ventilation  ESTIMATED BLOOD LOSS: 20ml  IV FLUIDS: see anesthesia record    DRAINS:  24 Malagasy kenny drain posterior mediastinal  19 Malagasy kenny drain left pleural    SPECIMEN: None    FINDINGS: Performed 19 ablations to the posterior left atrial wall.    INDICATION: Dann Whitt is a 53 year old male with history of chronic persistent atrial fibrillation. The patient was counseled on and understand the potential risks, alternatives, and benefits associated with sub-xiphoid convergent ablation procedure with left VATS left atrial appendage clip and and elected to proceed with surgery.    OPERATION IN DETAIL: A timeout procedure was performed confirming the correct patient, surgical site, and procedure. The patient underwent uneventful general endotracheal anesthesia and invasive hemodynamic monitoring lines were placed. An inflatable bump was placed under the left chest. A temp probe was placed in the esophagus and positioning was confirmed under C-sarm. The neck, chest, abdomen, groins, and legs were prepped and draped in the usual sterile fashion with the bump inflated and a loose tuck of the left arm to  assist with exposure. Appropriate perioperative antibiotics were administered.     The bump was deflated and under ultasound guidance micro 6Fr catheters were placed in the right CFV and left CFA for monitoring and access. Each were flushed with heparin saline solution. A vertical incision was then made over the xiphoid which was subsequently resected.  The pericardial space was entered with cautery making a transverse incision to accommodate the trocar. The convergent trocar was inserted under direct visualization with the laparoscopic camera into the posterior pericardial space. The coronary sinus and the IVC were both identified and the trocar tip was guided up to the superior reflection. 5000 units of heparin were administered. The ablation probe was inserted and the suction to the probe was turned to ablate the furthest right and superior lesion. The trocar suction was turned off and the pericardium was filled with fluid to act as a heat sink. The probe was turned on for a 90 second ablation while monitoring for no rise in temperature in the esophagus. This was then repeated in  systematic fashion from superior fold to inferiorly and from on the right pulmonary vein to on the left pulmonary vein across the entire posterior wall of the left atrium. Additional lesions were added superiorly as the reflection would allow. A total of 19 ablations were completed. A 24Fr kenny drain was then placed into the posterior pericardial space and tunneled through the skin. Hemostasis was confirmed. The incision was injected circumferentially with local anesthetic and then closed in multiple layers using running 2-0 vicryl for the fascia, running 3-0 vicryl for the dermal-epidermal junction and running 3-0 vicryl for the epidermal layer.     The left shoulder pump was inflated. An incision was made over the mid axillary line at the 4th intercostal space and a 5mm trocar was inserted with a 0 degree scope for visualization.  Insufflation was then turned on and the camera was exchanged for a 30 degree scope. Additional ports were then inserted under directed visualization placing a 5mm port was along the anterior axillary line in the 2nd intercostal space and a 12mm port just posterior to the mid axillary line along the 7th intercostal space. A Ligasure device was then used to open the pericardium horizontally along the length of the heart posterior to the phrenic nerve taking care to proctect it. The pericardotomy was extended cephalad until the entirety of the left atrial appendage was easily visualized. Th ligament of Matt was then taken down using the ligasure. The base of the left atrial appendage was sized and a size 40mm Atriclip Pro2 was then used to ligate the atrial appendage. The clip was closed on the based of the appendage confirming under CHAD that the appendage was successfully ligated at its base. The clip was left in place for 2 minutes confirming no wall motion abnormalities nor ST changed on ECG prior to releasing the clip from the device. The pericardium was then closed inferiorly using a 2-0 ethibond in interrupted fashion and tied with a knot pusher. Hemostasis was confirmed. Each of the intercostals with a port was then injected with local anesthetic under direct visualization for an intercostal nerve block. The incisions were also injected circumferentially sub-dermally. A 19 Swiss pericardial drain was placed in the mid axillary 5mm port site and hemostasis was once again confirmed. Next, the lung was insufflated under direct visualization. The he 12mm port site fascia was closed with a 0 UR6 and then it and the 2nd intercostal space 5mm port site were both closed in multiple layers using running 3-0 vicryl for the dermal-epidermal junction and running 3-0 vicryl for the epidermal layer. Skin glue was placed on the sub-xiphoid and port site incisions.    All counts were correct. The patient was subsequently  extubated in the OR and transferred to the cardiothoracic surgery intensive care with stable hemodynamics.       Skip Wei MD  [facility]

## 2024-07-09 NOTE — CONSULTS
[facility]  Report of Consultation    Dann Whitt Patient Status:  Inpatient    10/31/1970 MRN [id number]   Location [facility] 6NE-A Attending Skip Wei MD   Hosp Day # 0 PCP Chris Pollard MD     Reason for Consultation:  PAF s/p  subxiphoid posterior atrial wall ablation, VATS with left atrial appendage clip- post operative management     History of Present Illness:  Dann Whitt is a a(n) 53 year old male followed by Dr Umanzor with hx of symptomatic paf with rapid ventricular response refractory to attempts at cardioversion, flecainide and sotalol. He presented electively for the above procedure with Dr GRADY Wei.     Currently the patient is resting in bed. Pain control currently an issue, otherwise clinically stable. HR mildly elevated however not unexpected at this juncture.    History:  Past Medical History:    Arrhythmia     Past Surgical History:   Procedure Laterality Date    Anesth,knee joint,closed procedure Right 2003    bursa removed    Hand/finger surgery unlisted  1989    left bone graft (pinky finger)    Other surgical history  2001    corneal lasik     Family History   Problem Relation Age of Onset    Diabetes Mother     Colon Cancer Father     No Known Problems Sister     No Known Problems Brother     No Known Problems Brother       reports that he has quit smoking. His smoking use included cigarettes. He has never used smokeless tobacco. He reports current alcohol use. He reports current drug use. Drug: Cannabis.    Allergies:  No Active Allergies    Medications:    Current Facility-Administered Medications:     lactated ringers infusion, , Intravenous, Continuous    lactated ringers IV bolus 500 mL, 500 mL, Intravenous, Once PRN    atropine 0.1 MG/ML injection 0.5 mg, 0.5 mg, Intravenous, PRN    naloxone (Narcan) 0.4 MG/ML injection 0.08 mg, 0.08 mg, Intravenous, PRN    fentaNYL (Sublimaze) 50 mcg/mL injection 25 mcg, 25 mcg, Intravenous, Q5 Min PRN  **OR** fentaNYL (Sublimaze) 50 mcg/mL injection 50 mcg, 50 mcg, Intravenous, Q5 Min PRN    HYDROmorphone (Dilaudid) 1 MG/ML injection 0.2 mg, 0.2 mg, Intravenous, Q5 Min PRN **OR** HYDROmorphone (Dilaudid) 1 MG/ML injection 0.4 mg, 0.4 mg, Intravenous, Q5 Min PRN **OR** HYDROmorphone (Dilaudid) 1 MG/ML injection 0.6 mg, 0.6 mg, Intravenous, Q5 Min PRN    midazolam (Versed) 2 MG/2ML injection 1 mg, 1 mg, Intravenous, Q5 Min PRN    diphenhydrAMINE (Benadryl) 50 mg/mL  injection 12.5 mg, 12.5 mg, Intravenous, PRN    metoprolol succinate ER (Toprol XL) 24 hr tab 50 mg, 50 mg, Oral, Daily Beta Blocker    sodium chloride 0.9% infusion, , Intravenous, Continuous    ondansetron (Zofran) 4 MG/2ML injection 4 mg, 4 mg, Intravenous, Q6H PRN    prochlorperazine (Compazine) 10 MG/2ML injection 5 mg, 5 mg, Intravenous, Q8H PRN    acetaminophen (Tylenol Extra Strength) tab 1,000 mg, 1,000 mg, Oral, Q8H    oxyCODONE immediate release tab 5 mg, 5 mg, Oral, Q4H PRN **OR** oxyCODONE immediate release tab 10 mg, 10 mg, Oral, Q4H PRN    HYDROmorphone (Dilaudid) 1 MG/ML injection 0.4 mg, 0.4 mg, Intravenous, Q2H PRN **OR** HYDROmorphone (Dilaudid) 1 MG/ML injection 0.8 mg, 0.8 mg, Intravenous, Q2H PRN    colchicine tab 0.6 mg, 0.6 mg, Oral, Daily    [START ON 7/10/2024] methylPREDNISolone (Medrol) tab 4 mg, 4 mg, Oral, TID CC    [START ON 7/10/2024] methylPREDNISolone (Medrol) tab 8 mg, 8 mg, Oral, nightly    [START ON 7/11/2024] methylPREDNISolone (Medrol) tab 4 mg, 4 mg, Oral, TID CC and HS    [START ON 7/12/2024] methylPREDNISolone (Medrol) tab 4 mg, 4 mg, Oral, Daily with breakfast    [START ON 7/12/2024] methylPREDNISolone (Medrol) tab 4 mg, 4 mg, Oral, Daily with lunch    [START ON 7/12/2024] methylPREDNISolone (Medrol) tab 4 mg, 4 mg, Oral, nightly    [START ON 7/13/2024] methylPREDNISolone (Medrol) tab 4 mg, 4 mg, Oral, Daily with breakfast    [START ON 7/13/2024] methylPREDNISolone (Medrol) tab 4 mg, 4 mg, Oral, Nightly     [START ON 2024] methylPREDNISolone (Medrol) tab 4 mg, 4 mg, Oral, Daily with breakfast    ceFAZolin (Ancef) 2g in 10mL IV syringe premix, 2 g, Intravenous, Q8H    [COMPLETED] methylPREDNISolone (Medrol) tab 8 mg, 8 mg, Oral, Once **FOLLOWED BY** methylPREDNISolone (Medrol) tab 4 mg, 4 mg, Oral, Once **FOLLOWED BY** methylPREDNISolone (Medrol) tab 4 mg, 4 mg, Oral, Once **FOLLOWED BY** methylPREDNISolone (Medrol) tab 8 mg, 8 mg, Oral, nightly    heparin (Porcine) 25,000 Units/250mL infusion premix, 500 Units/hr, Intravenous, Continuous    bupivacaine 0.25%-EPINEPHrine 1:200,000 PF (Marcaine/Epinephrine PF) injection, , , PRN    ketorolac (Toradol) 15 MG/ML injection 15 mg, 15 mg, Intravenous, q6h    Review of Systems:  All systems were reviewed and are negative except as described above in HPI.    Physical Exam:  Blood pressure (!) 120/93, pulse 109, temperature 97 °F (36.1 °C), temperature source Temporal, resp. rate 17, height 6' 4\" (1.93 m), weight 245 lb (111.1 kg), SpO2 99%.  Temp (24hrs), Av.1 °F (36.2 °C), Min:97 °F (36.1 °C), Max:97.4 °F (36.3 °C)    Wt Readings from Last 3 Encounters:   24 245 lb (111.1 kg)   24 240 lb (108.9 kg)   24 240 lb (108.9 kg)       Telemetry: afib, hr 100-1-teens  General: Alert and oriented x 3  HEENT: No focal deficits.  Neck: No JVD, carotids 2+ no bruits.  Cardiac: Regular rate and rhythm, S1, S2 normal, no murmur, rub or gallop.  Lungs: Clear without wheezes, rales, rhonchi or dullness.  Normal excursions and effort.  Abdomen: Soft, non-tender.   Extremities: Without clubbing, cyanosis or edema.  Peripheral pulses are 2+.  Neurologic: Alert and oriented, normal affect.  Skin: Warm and dry.     Laboratories and Data:  Diagnostics:    EK24  Afib with controlled vr    Echo: 2024     1. The study quality is good.   2. The left ventricle is normal in size and wall thickness. Global left ventricular systolic function is low normal. The left  ventricular ejection fraction is 50%. Left ventricular diastolic function is indeterminate. No regional wall motion abnormalities.  3. The right ventricle is normal in size. Right ventricular systolic function is borderline normal.  4. The estimated pulmonary artery systolic pressure is 22 mmHg assuming a right atrial pressure of 3 mmHg.   5. The left atrium is mildly dilated measuring 4.4 cms.   6. The right atrium is moderately dilated measuring 5.1 cms.     CTA Chest: 6/18/24      1)  Five pulmonary veins as described above.  2)  No left atrial appendage thrombus.  3)  No significant plaque or stenosis in the visualized major epicardial coronary arteries.    CXR: 7/9/24    Tortuous ectatic aorta present.  Cardiac shadow is enlarged..  Left lower lobe atelectasis.  No sign of pneumothorax.  Chest tube at the left base, tip appearing medially.  No sizable effusion.  No sizable effusion.     Labs:      Lab Results   Component Value Date    INR 1.57 (H) 06/18/2024       Assessment/Plan:  Patient Active Problem List   Diagnosis    Pneumonia, organism unspecified(486)    Unspecified sinusitis (chronic)    Sciatica       Persistent symptomatic paroxysmal afib s/p subxiphoid posterior atrial wall ablation, VATS with left atrial appendage clip- pod 0- ef 50%  Hemodynamically stable  Currently in afib with acceptable rate control (100-1-teens) in setting of uncontrolled pain   Htn     Plan:    Resume home toprol. If rates elevated tomorrow, can adjust meds as needed  Optimize pain control  Resume anticoagualtion when cleared to do so by [facility]      DAKOTA Quintero  7/9/2024  2:00 PM     Chart reveiwed and decision making performed in entirety with discussion with staff. Agree with above note and assessment with the following additions made below.     Add tikosyn 250 mcg BID  Start DOAC for a few months when ok w surgery  Rate control.    Vlad Umanzor MD  [facility]  Cardiac Electrophysiolgy

## 2024-07-09 NOTE — ANESTHESIA POSTPROCEDURE EVALUATION
[facility]    Dann Whitt Patient Status:  Inpatient   Age/Gender 53 year old male MRN [id number]   Location [facility] 6NE-A Attending Skip Wei MD   Hosp Day # 0 PCP Chris Pollard MD       Anesthesia Post-op Note    CONVERGENT PROCEDURE    Procedure Summary       Date: 07/09/24 Room / Location: [facility] 02 / [facility]    Anesthesia Start: 0653 Anesthesia Stop: 1132    Procedure: CONVERGENT PROCEDURE (Chest) Diagnosis: (same)    Surgeons: Skip Wei MD Anesthesiologist: Harry Orozco MD    Anesthesia Type: general ASA Status: 3            Anesthesia Type: general    Vitals Value Taken Time   /72 07/09/24 1132   Temp 97 07/09/24 1132   Pulse 118 07/09/24 1132   Resp 26 07/09/24 1132   SpO2 95% 07/09/24 1132   Vitals shown include unfiled device data.    Patient Location: PACU    Anesthesia Type: general    Airway Patency: patent    Postop Pain Control: adequate    Mental Status: mildly sedated but able to meaningfully participate in the post-anesthesia evaluation    Nausea/Vomiting: none    Cardiopulmonary/Hydration status: stable euvolemic    Complications: no apparent anesthesia related complications    Postop vital signs: stable    Dental Exam: Unchanged from Preop    Patient to be transferred to ICU.

## 2024-07-09 NOTE — ANESTHESIA PROCEDURE NOTES
Peripheral IV  Date/Time: 7/9/2024 7:36 AM  Inserted by: Harry Orozco MD    Placement  Needle size: 14 G  Laterality: left  Location: antecubital  Local anesthetic: none  Site prep: chlorhexidine  Technique: ultrasound guided  Attempts: 1

## 2024-07-10 ENCOUNTER — APPOINTMENT (OUTPATIENT)
Dept: GENERAL RADIOLOGY | Facility: HOSPITAL | Age: 54
End: 2024-07-10
Attending: SURGERY
Payer: COMMERCIAL

## 2024-07-10 ENCOUNTER — APPOINTMENT (OUTPATIENT)
Dept: GENERAL RADIOLOGY | Facility: HOSPITAL | Age: 54
End: 2024-07-10
Attending: PHYSICIAN ASSISTANT
Payer: COMMERCIAL

## 2024-07-10 LAB
ANION GAP SERPL CALC-SCNC: 4 MMOL/L (ref 0–18)
APTT PPP: 29.7 SECONDS (ref 23–36)
ATRIAL RATE: 122 BPM
ATRIAL RATE: 416 BPM
BLOOD TYPE BARCODE: 5100
BUN BLD-MCNC: 17 MG/DL (ref 9–23)
CALCIUM BLD-MCNC: 8.2 MG/DL (ref 8.5–10.1)
CHLORIDE SERPL-SCNC: 111 MMOL/L (ref 98–112)
CO2 SERPL-SCNC: 24 MMOL/L (ref 21–32)
CREAT BLD-MCNC: 1 MG/DL
EGFRCR SERPLBLD CKD-EPI 2021: 89 ML/MIN/1.73M2 (ref 60–?)
ERYTHROCYTE [DISTWIDTH] IN BLOOD BY AUTOMATED COUNT: 12.6 %
GLUCOSE BLD-MCNC: 149 MG/DL (ref 70–99)
HCT VFR BLD AUTO: 40.1 %
HGB BLD-MCNC: 14.4 G/DL
MAGNESIUM SERPL-MCNC: 2.2 MG/DL (ref 1.6–2.6)
MCH RBC QN AUTO: 30.5 PG (ref 26–34)
MCHC RBC AUTO-ENTMCNC: 35.9 G/DL (ref 31–37)
MCV RBC AUTO: 85 FL
OSMOLALITY SERPL CALC.SUM OF ELEC: 292 MOSM/KG (ref 275–295)
PLATELET # BLD AUTO: 242 10(3)UL (ref 150–450)
POTASSIUM SERPL-SCNC: 4 MMOL/L (ref 3.5–5.1)
Q-T INTERVAL: 356 MS
Q-T INTERVAL: 366 MS
QRS DURATION: 82 MS
QRS DURATION: 88 MS
QTC CALCULATION (BEZET): 447 MS
QTC CALCULATION (BEZET): 455 MS
R AXIS: 12 DEGREES
R AXIS: 7 DEGREES
RBC # BLD AUTO: 4.72 X10(6)UL
SODIUM SERPL-SCNC: 139 MMOL/L (ref 136–145)
T AXIS: -34 DEGREES
T AXIS: -36 DEGREES
UNIT VOLUME: 350 ML
VENTRICULAR RATE: 93 BPM
VENTRICULAR RATE: 95 BPM
WBC # BLD AUTO: 13.7 X10(3) UL (ref 4–11)

## 2024-07-10 PROCEDURE — 85027 COMPLETE CBC AUTOMATED: CPT | Performed by: PHYSICIAN ASSISTANT

## 2024-07-10 PROCEDURE — 93010 ELECTROCARDIOGRAM REPORT: CPT | Performed by: INTERNAL MEDICINE

## 2024-07-10 PROCEDURE — 93005 ELECTROCARDIOGRAM TRACING: CPT

## 2024-07-10 PROCEDURE — 85730 THROMBOPLASTIN TIME PARTIAL: CPT | Performed by: PHYSICIAN ASSISTANT

## 2024-07-10 PROCEDURE — 71045 X-RAY EXAM CHEST 1 VIEW: CPT | Performed by: SURGERY

## 2024-07-10 PROCEDURE — 80048 BASIC METABOLIC PNL TOTAL CA: CPT | Performed by: PHYSICIAN ASSISTANT

## 2024-07-10 PROCEDURE — 83735 ASSAY OF MAGNESIUM: CPT | Performed by: NURSE PRACTITIONER

## 2024-07-10 PROCEDURE — 71045 X-RAY EXAM CHEST 1 VIEW: CPT | Performed by: PHYSICIAN ASSISTANT

## 2024-07-10 RX ORDER — DOFETILIDE 0.25 MG/1
250 CAPSULE ORAL EVERY 12 HOURS
Status: DISCONTINUED | OUTPATIENT
Start: 2024-07-10 | End: 2024-07-12

## 2024-07-10 RX ORDER — CYCLOBENZAPRINE HCL 5 MG
5 TABLET ORAL EVERY 8 HOURS PRN
Status: DISCONTINUED | OUTPATIENT
Start: 2024-07-10 | End: 2024-07-12

## 2024-07-10 NOTE — PROGRESS NOTES
Progress Note  Dann Whitt Patient Status:  Inpatient    10/31/1970 MRN [id number]   [facility] 6NE-A Attending Skip Wei MD   Hosp Day # 1 PCP Chris Pollard MD     Dann Loza is a 54 yo admitted for Convergent subxiphoid posterior atrial wall ablation, VATs with ODALYS clip on 24    Subjective:  He is awake and alert and sitting upright in chair. Some pleuritic chest discomfort and post-operative pain. He is in rate controlled AFIB.    Objective:  /88   Pulse 86   Temp (!) 96.1 °F (35.6 °C) (Temporal)   Resp 24   Ht 6' 4\" (1.93 m)   Wt 250 lb (113.4 kg)   SpO2 99%   BMI 30.43 kg/m²     Intake/Output:    Intake/Output Summary (Last 24 hours) at 7/10/2024 0939  Last data filed at 7/10/2024 0800  Gross per 24 hour   Intake 800.8 ml   Output 1615 ml   Net -814.2 ml       Last 3 Weights   07/10/24 0600 250 lb (113.4 kg)   24 0606 245 lb (111.1 kg)   24 1359 240 lb (108.9 kg)   24 1141 240 lb (108.9 kg)   24 0942 240 lb (108.9 kg)       Labs:  Recent Labs   Lab 07/10/24  0410   *   BUN 17   CREATSERUM 1.00   EGFRCR 89   CA 8.2*      K 4.0      CO2 24.0     Recent Labs   Lab 07/10/24  0410   RBC 4.72   HGB 14.4   HCT 40.1   MCV 85.0   MCH 30.5   MCHC 35.9   RDW 12.6   WBC 13.7*   .0         No results for input(s): \"TROP\", \"TROPHS\", \"CK\" in the last 168 hours.    Diagnostics:   Telemetry: rate controlled AFIB    EK24 Afib with controlled vr     Echo: 2024  1.           The study quality is good.   2.The left ventricle is normal in size and wall thickness. Global left ventricular systolic function is low normal. The left ventricular ejection fraction is 50%. Left ventricular diastolic function is indeterminate. No regional wall motion abnormalities.  3.The right ventricle is normal in size. Right ventricular systolic function is borderline normal.  4.           The estimated pulmonary artery systolic pressure is  22 mmHg assuming a right atrial pressure of 3 mmHg.   5.           The left atrium is mildly dilated measuring 4.4 cms.   6.The right atrium is moderately dilated measuring 5.1 cms.      CTA Chest: 6/18/24    1)  Five pulmonary veins as described above.  2)  No left atrial appendage thrombus.  3)  No significant plaque or stenosis in the visualized major epicardial coronary arteries.     CXR: 7/9/24  Tortuous ectatic aorta present.  Cardiac shadow is enlarged..  Left lower lobe atelectasis.  No sign of pneumothorax.  Chest tube at the left base, tip appearing medially.  No sizable effusion.  No sizable effusion.       Review of Systems   Cardiovascular:  Positive for chest pain.   Respiratory:  Positive for shortness of breath.        Physical Exam:  General: Alert and oriented in no apparent distress.  HEENT: Pupils equal. Mucous membranes moist.   Neck: No JVD  Cardiac:  Normal S1 S2, Regular. No murmur  Lungs: Clear without wheezes or crackles. 2 L NC. +chest tube  Abdomen: Soft, non-tender, ND  Extremities: Without clubbing, cyanosis or edema.    Neurologic: No focal deficits. Normal affect.  Skin: Warm and dry,    Medications:   dofetilide  250 mcg Oral Q12H    metoprolol succinate ER  50 mg Oral Daily Beta Blocker    acetaminophen  1,000 mg Oral Q8H    colchicine  0.6 mg Oral Daily    methylPREDNISolone  4 mg Oral TID CC    methylPREDNISolone  8 mg Oral nightly    [START ON 7/11/2024] methylPREDNISolone  4 mg Oral TID CC and HS    [START ON 7/12/2024] methylPREDNISolone  4 mg Oral Daily with breakfast    [START ON 7/12/2024] methylPREDNISolone  4 mg Oral Daily with lunch    [START ON 7/12/2024] methylPREDNISolone  4 mg Oral nightly    [START ON 7/13/2024] methylPREDNISolone  4 mg Oral Daily with breakfast    [START ON 7/13/2024] methylPREDNISolone  4 mg Oral Nightly    [START ON 7/14/2024] methylPREDNISolone  4 mg Oral Daily with breakfast    ceFAZolin  2 g Intravenous Q8H    ketorolac  15 mg Intravenous q6h       lactated ringers      sodium chloride 60 mL/hr at 07/09/24 1122    heparin 500 Units/hr (07/09/24 2121)       Assessment:    Persistent, symptomatic atrial fibrillation s/p Convergent subxiphoid posterior atrial wall ablation, VATS with ODALYS clip on 7/9/24  Hemodynamically stable  Currently in rate controlled AFIB  Start Tikosyn 250 mcg BID and monitor for first 5 doses. EKG 2 hours after each dose for Qtc monitoring. Keep K>4, Mg>2.  Toprol-XL 50 mg daily  On heparin. Start DOAC when ok with surgery.   Hypertension - Controlled    Plan:    Starting Tikosyn load. Will review serial EKGs.  If he does not chemically convert by 7/12 dose 5/5, will plan on DCCV  Start DOAC when ok with surgery  Remove chest tube when ok with CSA, possibly this afternoon      Plan of care discussed with patient, RN.    VARUN Lainez  7/10/2024  9:39 AM   I personally saw the patient with the PA, and completed the key components of the history and physical exam. I then discussed the management plan with the PA.   gen in nad resp clear cardiac no murmur abd soft msk skin as doucmneted in peconic chart  tx saent for hand admission 2/2 tendon lac right 5th digits

## 2024-07-10 NOTE — PROGRESS NOTES
[facility]   part of [facility]    Cardiology Progress Note    Dann Whitt Patient Status:  Inpatient    10/31/1970 MRN [id number]   Location [facility] 6NE-A Attending Skip Wei MD   Hosp Day # 1 PCP Chris Pollard MD     Date:  7/10/2024  Date of Admission:  2024    Subjective   POD 1 Convergent/ODALYS clip  Pain better after Chest tubes out      Allergies/Medications:   Allergies: No Known Allergies  Medications Prior to Admission   Medication Sig    metoprolol succinate ER 50 MG Oral Tablet 24 Hr Take 0.5 tablets (25 mg total) by mouth daily. (Patient taking differently: Take 1 tablet (50 mg total) by mouth daily.)    NON FORMULARY Take 1 tablet by mouth daily. B root supplement daily, fruit/vegetable supplement vitamin daily    rivaroxaban (XARELTO) 20 MG Oral Tab Take 1 tablet (20 mg total) by mouth daily with food.    Sildenafil Citrate 100 MG Oral Tab Take 1 tablet (100 mg total) by mouth daily as needed for Erectile Dysfunction.       Review of Systems:   Pertinent items are noted in HPI.      Telemetry:   Telemetry: Afib    Physical Exam:   Vital Signs:  Blood pressure 103/84, pulse 88, temperature 98.6 °F (37 °C), temperature source Temporal, resp. rate 17, height 6' 4\" (1.93 m), weight 250 lb (113.4 kg), SpO2 91%.     General: No acute distress. Alert and oriented x 3.  HEENT: Moist mucous membranes. EOM-I. PERRL  Neck: No lymphadenopathy.  No JVD. No carotid bruits.  Respiratory: Clear to auscultation bilaterally.  No wheezes. No rhonchi.  Cardiovascular: S1, S2.  Regular rate and rhythm.  No murmurs. Equal pulses   Abdomen: Soft, nontender, nondistended.  Positive bowel sounds. No rebound tenderness  Neurologic: No focal neurological deficits.  Musculoskeletal: Full range of motion of all extremities.  No swelling noted.  Integument: No lesions. No erythema.  Psychiatric: Appropriate mood and affect.          Results:     Lab Results   Component Value Date    WBC 13.7  (H) 07/10/2024    HGB 14.4 07/10/2024    HCT 40.1 07/10/2024    .0 07/10/2024    CREATSERUM 1.00 07/10/2024    BUN 17 07/10/2024     07/10/2024    K 4.0 07/10/2024     07/10/2024    CO2 24.0 07/10/2024     (H) 07/10/2024    CA 8.2 (L) 07/10/2024    ALB 3.8 06/18/2024    ALKPHO 52 06/18/2024    BILT 0.5 06/18/2024    TP 7.2 06/18/2024    AST 19 06/18/2024    ALT 26 06/18/2024    PTT 29.7 07/10/2024    INR 1.57 (H) 06/18/2024    TSH 2.620 12/27/2023    MG 2.2 07/10/2024       XR CHEST AP PORTABLE  (CPT=71045)    Result Date: 7/10/2024  CONCLUSION: No acute cardiopulmonary abnormality.   LOCATION:  [facility]      Dictated by (CST): Reji Lee MD on 7/10/2024 at 2:21 PM     Finalized by (CST): Reji Lee MD on 7/10/2024 at 2:21 PM       XR CHEST AP PORTABLE  (CPT=71045)    Result Date: 7/10/2024  CONCLUSION:  The heart size is upper limits of normal.  There is no CHF. A left chest tube is noted.  No pneumothorax.  There is some stable left lower lobe atelectasis versus consolidation which extends into the superior segment of the left lower lobe.  Trace amount of pneumomediastinum is noted along left heart border.  No significant effusion.  Left atrial appendage device noted.  The patient is status post convergent  procedure. The right lung is clear.   LOCATION:  [id number]      Dictated by (CST): Walt Ruvalcaba MD on 7/10/2024 at 6:06 AM     Finalized by (CST): Walt Ruvalcaba MD on 7/10/2024 at 6:15 AM       XR CHEST AP PORTABLE  (CPT=71045)    Result Date: 7/9/2024  CONCLUSION:  Tortuous ectatic aorta.  Cardiac shadow is enlarged.  No acute disease seen.    LOCATION:  [facility]      Dictated by (CST): Ian Thorne MD on 7/09/2024 at 11:35 AM     Finalized by (CST): Ian Thorne MD on 7/09/2024 at 11:37 AM       XR CHEST AP PORTABLE  (CPT=71045)    Result Date: 7/9/2024  CONCLUSION:  Endotracheal tube with 1 lumen in the distal trachea and the other in the left mainstem bronchus.   Findings were called to the CVOR.   LOCATION:  [facility]      Dictated by (CST): Popeye Bullock MD on 7/09/2024 at 8:01 AM     Finalized by (CST): Popeye Bullock MD on 7/09/2024 at 8:05 AM      EKG 12 Lead    Result Date: 7/10/2024  Atrial fibrillation Nonspecific T wave abnormality Abnormal ECG When compared with ECG of 10-JUL-2024 09:45, No significant change was found    EKG 12 Lead    Result Date: 7/10/2024  Atrial fibrillation Nonspecific T wave abnormality Abnormal ECG When compared with ECG of 18-JUN-2024 10:07, Nonspecific T wave abnormality, worse in Inferior leads Nonspecific T wave abnormality now evident in Lateral leads     Problem List     Patient Active Problem List   Diagnosis    Pneumonia, organism unspecified(486)    Unspecified sinusitis (chronic)    Sciatica    Persistent atrial fibrillation (HCC)       Diagnostic Testing     Assessment     Afib - s/p Convergent/ODALYS clip  HTN       Plan     Tikosyn loading  DOAC when ok with CT surgery  DCCV prior to discharge if he does not convert.        R3      ______________________________  Omar Gomez MD, FACC, FHRS  [facility]  Cardiac Electrophysiology

## 2024-07-10 NOTE — PLAN OF CARE
Assumed care of patient around 1930 s/p convergent. Pt alert and oriented x4. Following all commands. Neuro intact. Afib on monitor. HR controlled. Right ART line intact. Maintaining SBP <140. 2L NC. VSS overnight. Chest tubes to suction - please see flowsheets for output. Incisions and dressings C/D/I. PRN medication administered for pain with relief. Denies c/o chest pain and dyspnea. CXR completed this AM. Pt up in chair this AM, tolerating well.

## 2024-07-10 NOTE — PLAN OF CARE
Received patient at 07:30 awake and alert up in chair.  Chest tubes x2 to suction.  Pain medications as needed.  Removed rina and rincon at 11:00.  EKG per MD orders.  Wife at bedside.   Problem: PAIN - ADULT  Goal: Verbalizes/displays adequate comfort level or patient's stated pain goal  Description: INTERVENTIONS:  - Encourage pt to monitor pain and request assistance  - Assess pain using appropriate pain scale  - Administer analgesics based on type and severity of pain and evaluate response  - Implement non-pharmacological measures as appropriate and evaluate response  - Consider cultural and social influences on pain and pain management  - Manage/alleviate anxiety  - Utilize distraction and/or relaxation techniques  - Monitor for opioid side effects  - Notify MD/LIP if interventions unsuccessful or patient reports new pain  - Anticipate increased pain with activity and pre-medicate as appropriate  Outcome: Progressing

## 2024-07-11 LAB
ANION GAP SERPL CALC-SCNC: 7 MMOL/L (ref 0–18)
ATRIAL RATE: 108 BPM
ATRIAL RATE: 119 BPM
BUN BLD-MCNC: 18 MG/DL (ref 9–23)
CALCIUM BLD-MCNC: 8.7 MG/DL (ref 8.5–10.1)
CHLORIDE SERPL-SCNC: 107 MMOL/L (ref 98–112)
CO2 SERPL-SCNC: 25 MMOL/L (ref 21–32)
CREAT BLD-MCNC: 1.17 MG/DL
EGFRCR SERPLBLD CKD-EPI 2021: 74 ML/MIN/1.73M2 (ref 60–?)
GLUCOSE BLD-MCNC: 140 MG/DL (ref 70–99)
MAGNESIUM SERPL-MCNC: 2.4 MG/DL (ref 1.6–2.6)
OSMOLALITY SERPL CALC.SUM OF ELEC: 292 MOSM/KG (ref 275–295)
POTASSIUM SERPL-SCNC: 3.7 MMOL/L (ref 3.5–5.1)
Q-T INTERVAL: 302 MS
Q-T INTERVAL: 374 MS
QRS DURATION: 82 MS
QRS DURATION: 82 MS
QTC CALCULATION (BEZET): 424 MS
QTC CALCULATION (BEZET): 494 MS
R AXIS: -16 DEGREES
R AXIS: 26 DEGREES
SODIUM SERPL-SCNC: 139 MMOL/L (ref 136–145)
T AXIS: -33 DEGREES
T AXIS: -61 DEGREES
VENTRICULAR RATE: 105 BPM
VENTRICULAR RATE: 119 BPM

## 2024-07-11 PROCEDURE — 93010 ELECTROCARDIOGRAM REPORT: CPT | Performed by: INTERNAL MEDICINE

## 2024-07-11 PROCEDURE — 93005 ELECTROCARDIOGRAM TRACING: CPT

## 2024-07-11 PROCEDURE — 80048 BASIC METABOLIC PNL TOTAL CA: CPT | Performed by: NURSE PRACTITIONER

## 2024-07-11 PROCEDURE — 83735 ASSAY OF MAGNESIUM: CPT | Performed by: NURSE PRACTITIONER

## 2024-07-11 RX ORDER — DOFETILIDE 0.25 MG/1
250 CAPSULE ORAL 2 TIMES DAILY
Qty: 60 CAPSULE | Refills: 1 | Status: SHIPPED | OUTPATIENT
Start: 2024-07-11

## 2024-07-11 RX ORDER — POTASSIUM CHLORIDE 20 MEQ/1
20 TABLET, EXTENDED RELEASE ORAL ONCE
Status: COMPLETED | OUTPATIENT
Start: 2024-07-11 | End: 2024-07-11

## 2024-07-11 RX ORDER — METOPROLOL SUCCINATE 50 MG/1
50 TABLET, EXTENDED RELEASE ORAL
Status: DISCONTINUED | OUTPATIENT
Start: 2024-07-11 | End: 2024-07-12

## 2024-07-11 NOTE — PROGRESS NOTES
[facility]   part of [facility]     Cardiology Progress Note        Dann Whitt Patient Status:  Inpatient    10/31/1970 MRN [id number]   Location [facility] 6NE-A Attending Skip Wei MD   Hosp Day # 2 PCP Chris Pollard MD       Subjective/ROS:  Pain better controlled. Does not feel arrhythmia    Objective:  BP (!) 159/137   Pulse 120   Temp 97.2 °F (36.2 °C) (Temporal)   Resp 23   Ht 6' 4\" (1.93 m)   Wt 250 lb (113.4 kg)   SpO2 98%   BMI 30.43 kg/m²     Temp (24hrs), Av.7 °F (36.5 °C), Min:97.1 °F (36.2 °C), Max:98.1 °F (36.7 °C)      Tele  Afl with variable conduction, rvr    Intake/Output:    Intake/Output Summary (Last 24 hours) at 2024 1353  Last data filed at 2024 1200  Gross per 24 hour   Intake 900 ml   Output 250 ml   Net 650 ml       Patient Weight for the past 72 hrs:   Weight   24 0606 245 lb (111.1 kg)   07/10/24 0600 250 lb (113.4 kg)        Physical Exam:    Gen: alert, oriented x 3, NAD  Heent: pupils equal, reactive. Mucous membranes moist.   Neck: no jvd  Cardiac: irregularly irregular, normal S1,S2  Lungs: diminished left , clear right   Abd: soft, NT/ND +bs  Ext: no edema  Skin: Warm, dry  Neuro: No focal deficits    Labs:  Lab Results   Component Value Date    CREATSERUM 1.17 2024    BUN 18 2024     2024    K 3.7 2024     2024    CO2 25.0 2024     2024    CA 8.7 2024    MG 2.4 2024           Medications:     dofetilide  250 mcg Oral Q12H    rivaroxaban  20 mg Oral QPM    metoprolol succinate ER  50 mg Oral Daily Beta Blocker    acetaminophen  1,000 mg Oral Q8H    colchicine  0.6 mg Oral Daily    methylPREDNISolone  4 mg Oral TID CC and HS    [START ON 2024] methylPREDNISolone  4 mg Oral Daily with breakfast    [START ON 2024] methylPREDNISolone  4 mg Oral Daily with lunch    [START ON 2024] methylPREDNISolone  4 mg Oral nightly    [START ON  7/13/2024] methylPREDNISolone  4 mg Oral Daily with breakfast    [START ON 7/13/2024] methylPREDNISolone  4 mg Oral Nightly    [START ON 7/14/2024] methylPREDNISolone  4 mg Oral Daily with breakfast         Assessment:    Persistent symptomatic paroxysmal afib s/p subxiphoid posterior atrial wall ablation, VATS with left atrial appendage clip 7/9/24- ef 50%   Doing well from post surgical standpoint  Back on xarelto  Remains in af (appears like flutter on EKG) with elevated hr. Has received 3 doses of tikosyn. Qtc difficult to determine given flutter waves  Htn- bp elevated    Plan:     Continue tikosyn at present dose  Increase bb  Continue xarelto  Plan for dccv tomorrow if does not convert to sinus. Reviewed with pt and his wife who are agreeable  Hopefully home tomorrow afternoon  Script for tikosyn 250 mcg po bid sent to [place] pharmacy and will be delivered to bedside. Will also need a paper script sent with him upon dc to bring to his own pharmacy  Ok to transfer to ctu    Discussed plan of care with patient and nursing.       Rachelle Lau NP  [phone]      Patient seen and examined independently.  Note reviewed and labs reviewed.  Agree with above assessment and plan.  Now in atrial flutter with 3:1 conduction.  Continue Tykosin load and plan DCCV tomorrow with EP.    SWETHA Stephenson MD

## 2024-07-11 NOTE — PLAN OF CARE
Assumed care of patient this am, patient alert and oriented, reporting mild surgical site pain. Up, ambulating, A-fib on monitor, asymptomatic. BP WDL, on RA. Using IS at 2,000.  Planned cardioversion with Dr. Umanzor on 7/12, consent in chart.

## 2024-07-11 NOTE — PLAN OF CARE
Patient VSS.  Intermittent pain relieved by PRN.  Patient surgical incisions intact with no complications.  Up walking and to the bathroom.  Patient updated with plan of care and will continue to monitor.

## 2024-07-11 NOTE — PROGRESS NOTES
[facility]   part of [facility]     CV Surgery Progress Note    Dann Whitt Patient Status:  Inpatient    10/31/1970 MRN [id number]   Location [facility] 6NE-A Attending Skip Wei MD   Hosp Day # 2 PCP Chris Pollard MD     Subjective:  Patient sitting in chair, reports feeling good. Ambulating well.     Tele: Afib    Objective:  BP (!) 155/108   Pulse (!) 121   Temp 97.1 °F (36.2 °C) (Temporal)   Resp 24   Ht 6' 4\" (1.93 m)   Wt 250 lb (113.4 kg)   SpO2 95%   BMI 30.43 kg/m²     Intake/Output:    Intake/Output Summary (Last 24 hours) at 2024 0925  Last data filed at 2024 0800  Gross per 24 hour   Intake 770 ml   Output 415 ml   Net 355 ml       Labs:       Lab Results   Component Value Date     2024    K 3.7 2024     2024    CO2 25.0 2024    BUN 18 2024    CREATSERUM 1.17 2024     2024    CA 8.7 2024     Lab Results   Component Value Date    INR 1.57 (H) 2024       Physical Exam:  General: VSS, A&Ox3, In NAD  Neck: No JVD  Heart: S1,S2 irregularly irregular   Lungs: clear   Abdomen: Soft, non-tender  Extremities: Warm, dry, no edema  Skin: sub xiphoid incision C/D/I  Neuro: no focal deficits     Assessment/Plan:   S/P Left VATS, sub xiphoid posterior left atrial wall ablation, and ODALYS clip POD#2  -HD stable   -Chronic persistent Afib, rates controlled- tikosyn per EP, possible DCCV tomorrow   -AC, on xarelto   -Volume status stable    -Renal function intact, good UOP  -Bowel regimen   -Pain management, tylenol/toradol/prn oxycodone  -Colchicine and medrol dose pack for post-op inflammation   -Chest tubes removed   -DVT PPX: TEDs/SCDs  -Encourage IS/ambulation   -PT/OT/Cardiac rehab   -Ok to transfer to CTU      -D/W Dr. Jamison/Eriberto Bradley PA-C   Cardiothoracic Surgery   2024  9:26 AM

## 2024-07-12 VITALS
TEMPERATURE: 97 F | WEIGHT: 249.13 LBS | RESPIRATION RATE: 24 BRPM | HEART RATE: 99 BPM | DIASTOLIC BLOOD PRESSURE: 107 MMHG | OXYGEN SATURATION: 96 % | SYSTOLIC BLOOD PRESSURE: 131 MMHG | HEIGHT: 76 IN | BODY MASS INDEX: 30.34 KG/M2

## 2024-07-12 LAB
ANION GAP SERPL CALC-SCNC: 4 MMOL/L (ref 0–18)
ATRIAL RATE: 326 BPM
ATRIAL RATE: 340 BPM
BUN BLD-MCNC: 18 MG/DL (ref 9–23)
CALCIUM BLD-MCNC: 8.1 MG/DL (ref 8.5–10.1)
CHLORIDE SERPL-SCNC: 110 MMOL/L (ref 98–112)
CO2 SERPL-SCNC: 26 MMOL/L (ref 21–32)
CREAT BLD-MCNC: 0.93 MG/DL
EGFRCR SERPLBLD CKD-EPI 2021: 98 ML/MIN/1.73M2 (ref 60–?)
GLUCOSE BLD-MCNC: 112 MG/DL (ref 70–99)
MAGNESIUM SERPL-MCNC: 2.1 MG/DL (ref 1.6–2.6)
OSMOLALITY SERPL CALC.SUM OF ELEC: 293 MOSM/KG (ref 275–295)
POTASSIUM SERPL-SCNC: 3.8 MMOL/L (ref 3.5–5.1)
Q-T INTERVAL: 342 MS
Q-T INTERVAL: 352 MS
QRS DURATION: 76 MS
QRS DURATION: 82 MS
QTC CALCULATION (BEZET): 473 MS
QTC CALCULATION (BEZET): 476 MS
R AXIS: 25 DEGREES
R AXIS: 31 DEGREES
SODIUM SERPL-SCNC: 140 MMOL/L (ref 136–145)
T AXIS: -75 DEGREES
T AXIS: -76 DEGREES
VENTRICULAR RATE: 110 BPM
VENTRICULAR RATE: 115 BPM

## 2024-07-12 PROCEDURE — 93010 ELECTROCARDIOGRAM REPORT: CPT | Performed by: INTERNAL MEDICINE

## 2024-07-12 PROCEDURE — 83735 ASSAY OF MAGNESIUM: CPT | Performed by: NURSE PRACTITIONER

## 2024-07-12 PROCEDURE — 93005 ELECTROCARDIOGRAM TRACING: CPT

## 2024-07-12 PROCEDURE — 5A2204Z RESTORATION OF CARDIAC RHYTHM, SINGLE: ICD-10-PCS | Performed by: INTERNAL MEDICINE

## 2024-07-12 PROCEDURE — 80048 BASIC METABOLIC PNL TOTAL CA: CPT | Performed by: NURSE PRACTITIONER

## 2024-07-12 RX ORDER — METOPROLOL SUCCINATE 50 MG/1
50 TABLET, EXTENDED RELEASE ORAL
Qty: 60 TABLET | Refills: 3 | Status: SHIPPED | OUTPATIENT
Start: 2024-07-12

## 2024-07-12 RX ORDER — DOFETILIDE 0.5 MG/1
500 CAPSULE ORAL EVERY 12 HOURS
Status: DISCONTINUED | OUTPATIENT
Start: 2024-07-12 | End: 2024-07-12

## 2024-07-12 RX ORDER — DOFETILIDE 0.25 MG/1
250 CAPSULE ORAL EVERY 12 HOURS
Status: DISCONTINUED | OUTPATIENT
Start: 2024-07-12 | End: 2024-07-12

## 2024-07-12 RX ORDER — POTASSIUM CHLORIDE 20 MEQ/1
40 TABLET, EXTENDED RELEASE ORAL ONCE
Status: DISCONTINUED | OUTPATIENT
Start: 2024-07-12 | End: 2024-07-12

## 2024-07-12 RX ORDER — COLCHICINE 0.6 MG/1
0.6 TABLET ORAL DAILY
Qty: 30 TABLET | Refills: 0 | Status: SHIPPED | OUTPATIENT
Start: 2024-07-13 | End: 2024-08-12

## 2024-07-12 RX ORDER — SODIUM CHLORIDE 9 MG/ML
INJECTION, SOLUTION INTRAVENOUS CONTINUOUS
Status: DISCONTINUED | OUTPATIENT
Start: 2024-07-12 | End: 2024-07-12

## 2024-07-12 RX ORDER — OXYCODONE HYDROCHLORIDE 5 MG/1
TABLET ORAL EVERY 6 HOURS PRN
Qty: 20 TABLET | Refills: 0 | Status: SHIPPED | OUTPATIENT
Start: 2024-07-12

## 2024-07-12 RX ORDER — NALOXONE HYDROCHLORIDE 4 MG/.1ML
4 SPRAY NASAL AS NEEDED
Qty: 1 KIT | Refills: 0 | Status: SHIPPED | OUTPATIENT
Start: 2024-07-12

## 2024-07-12 RX ORDER — DILTIAZEM HYDROCHLORIDE 60 MG/1
60 TABLET, FILM COATED ORAL EVERY 6 HOURS SCHEDULED
Status: DISCONTINUED | OUTPATIENT
Start: 2024-07-12 | End: 2024-07-12

## 2024-07-12 RX ORDER — DILTIAZEM HYDROCHLORIDE 120 MG/1
120 TABLET, FILM COATED ORAL 2 TIMES DAILY
Qty: 60 TABLET | Refills: 3 | Status: SHIPPED | OUTPATIENT
Start: 2024-07-12

## 2024-07-12 RX ORDER — METHYLPREDNISOLONE 4 MG/1
TABLET ORAL
Qty: 3 TABLET | Refills: 0 | Status: SHIPPED | OUTPATIENT
Start: 2024-07-12

## 2024-07-12 RX ORDER — METHOHEXITAL IN WATER/PF 100MG/10ML
100 SYRINGE (ML) INTRAVENOUS ONCE
Status: COMPLETED | OUTPATIENT
Start: 2024-07-12 | End: 2024-07-12

## 2024-07-12 RX ORDER — METHYLPREDNISOLONE 4 MG/1
TABLET ORAL
Qty: 3 TABLET | Refills: 0 | Status: SHIPPED | OUTPATIENT
Start: 2024-07-12 | End: 2024-07-12

## 2024-07-12 NOTE — PROGRESS NOTES
[facility] Cardiology Progress Note    Dann Whitt Patient Status:  Inpatient    10/31/1970 MRN [id number]   Location [facility] 6NE-A Attending Skip Wei MD   Hosp Day # 3 PCP Chris Pollard MD     Subjective:  No acute events overnight  Remains in afib   Anxious for discharge     Objective:  BP (!) 135/108 (BP Location: Right arm)   Pulse 99   Temp 97.7 °F (36.5 °C) (Temporal)   Resp 12   Ht 6' 4\" (1.93 m)   Wt 249 lb 1.9 oz (113 kg)   SpO2 96%   BMI 30.32 kg/m²     Telemetry: afib      Intake/Output:    Intake/Output Summary (Last 24 hours) at 2024 1203  Last data filed at 2024  Gross per 24 hour   Intake 925 ml   Output --   Net 925 ml       Last 3 Weights   24 0500 249 lb 1.9 oz (113 kg)   07/10/24 0600 250 lb (113.4 kg)   24 0606 245 lb (111.1 kg)   24 1359 240 lb (108.9 kg)   24 1141 240 lb (108.9 kg)   24 0942 240 lb (108.9 kg)       Labs:  Recent Labs   Lab 07/10/24  0410 24  0429 24  0441   * 140* 112*   BUN 17 18 18   CREATSERUM 1.00 1.17 0.93   EGFRCR 89 74 98   CA 8.2* 8.7 8.1*    139 140   K 4.0 3.7 3.8    107 110   CO2 24.0 25.0 26.0     Recent Labs   Lab 07/10/24  0410   RBC 4.72   HGB 14.4   HCT 40.1   MCV 85.0   MCH 30.5   MCHC 35.9   RDW 12.6   WBC 13.7*   .0         No results for input(s): \"TROP\", \"TROPHS\", \"CK\" in the last 168 hours.    Diagnostics:             Physical Exam:    Physical Exam  Cardiovascular:      Rate and Rhythm: Tachycardia present. Rhythm irregular.      Comments: Subxiphoid incision clean, dry, intact   Pulmonary:      Effort: Pulmonary effort is normal.      Breath sounds: No rales.   Abdominal:      Palpations: Abdomen is soft.   Musculoskeletal:      Right lower leg: No edema.      Left lower leg: No edema.   Skin:     General: Skin is warm and dry.   Neurological:      Mental Status: He is alert and oriented to person, place, and time.          Medications:   potassium chloride  40 mEq Oral Once    dofetilide  500 mcg Oral Q12H    metoprolol succinate ER  50 mg Oral 2x Daily(Beta Blocker)    rivaroxaban  20 mg Oral QPM    acetaminophen  1,000 mg Oral Q8H    colchicine  0.6 mg Oral Daily    methylPREDNISolone  4 mg Oral nightly    [START ON 7/13/2024] methylPREDNISolone  4 mg Oral Daily with breakfast    [START ON 7/13/2024] methylPREDNISolone  4 mg Oral Nightly    [START ON 7/14/2024] methylPREDNISolone  4 mg Oral Daily with breakfast      sodium chloride 20 mL/hr at 07/12/24 0753       Assessment:    Persistent symptomatic ParoxysmalAF  S/p posterior Atrial wall ablation, VATS and left atrial appendage clip 7/9/24  EF 50%  Remains tachy Afib after 3doses of Tikosyn 250mg bid   Xarelto resumed   On colchicine and medrol dose pack for post op inflammation   HTN    Plan:    Bedside cardioversion attempted by Dr. Umanzor. Maintained SR only briefly, Qtc stable while in SR.  [place]anto increase Tikosyn to 500mg BID and reatttempt DCCV Monday.    Mame Daniels, APRN  7/12/2024  12:03 PM  Ph [phone] ([place])  Ph [phone] ([place])

## 2024-07-12 NOTE — PLAN OF CARE
Assumed care at 1930. Intermittent pain to incision sites relieved with oxy prn. Afib/a flutter on monitor. NPO at midnight, plan for cardioversion today. Plan of care updated with patient at bedside.

## 2024-07-12 NOTE — PROGRESS NOTES
[facility] PROGRESS NOTE      Dann Whitt Patient Status:  Inpatient    10/31/1970 MRN [id number]   Location [facility] 6NE-A Attending Skip Wei MD   Hosp Day # 3 PCP Chris Pollard MD     Assessment/Plan   Persistent AF  Status post CONVERGENT with ODALYS clip.     - Attempted DCCV with early recurrent AF.   - Continue tikosyn for now, QTC stable.   - Titrate rate control meds, cardizem 60 mg BID + beta blocker, home later today if rates improve.   - DOAC if OK with surgery.       Subjective:  No chest pain or shortness of breath.    ROS:  No abdominal pain, no cough, no fevers, chills, denies musculoskeletal pain.     Objective:  BP (!) 135/108 (BP Location: Right arm)   Pulse 99   Temp 97.7 °F (36.5 °C) (Temporal)   Resp 12   Ht 76\"   Wt 249 lb 1.9 oz (113 kg)   SpO2 96%   BMI 30.32 kg/m²     Temp (24hrs), Av.8 °F (36.6 °C), Min:97.5 °F (36.4 °C), Max:98.2 °F (36.8 °C)      Intake/Output:    Intake/Output Summary (Last 24 hours) at 2024 1207  Last data filed at 2024  Gross per 24 hour   Intake 925 ml   Output --   Net 925 ml       Wt Readings from Last 2 Encounters:   24 249 lb 1.9 oz (113 kg)   24 240 lb (108.9 kg)       Physical Exam:    General: Alert,  No apparent distress.  HEENT: No focal deficits.  Neck: No JVD, carotids 2+ no bruits.  Cardiac: Regular rate and rhythm, S1, S2 normal, no murmur  Lungs: Clear without wheezes, rales, rhonchi.  Normal excursions and effort.  Abdomen: Soft, non-tender.   Extremities: Without clubbing, cyanosis or edema.  Peripheral pulses are 2+.  Skin: Warm and dry.     Labs:     Lab Results   Component Value Date    INR 1.57 (H) 2024     Lab Results   Component Value Date     2024    K 3.8 2024     2024    CO2 26.0 2024    BUN 18 2024    CREATSERUM 0.93 2024     2024    MG 2.1 2024    CA 8.1 2024        No results  found for: \"TROP\", \"CKMB\"     Medications:     potassium chloride  40 mEq Oral Once    dofetilide  500 mcg Oral Q12H    metoprolol succinate ER  50 mg Oral 2x Daily(Beta Blocker)    rivaroxaban  20 mg Oral QPM    acetaminophen  1,000 mg Oral Q8H    colchicine  0.6 mg Oral Daily    methylPREDNISolone  4 mg Oral nightly    [START ON 7/13/2024] methylPREDNISolone  4 mg Oral Daily with breakfast    [START ON 7/13/2024] methylPREDNISolone  4 mg Oral Nightly    [START ON 7/14/2024] methylPREDNISolone  4 mg Oral Daily with breakfast      sodium chloride 20 mL/hr at 07/12/24 0753       Vlad Umanzor MD  7/12/2024  12:07 PM    Patient Active Problem List   Diagnosis    Pneumonia, organism unspecified(486)    Unspecified sinusitis (chronic)    Sciatica    Persistent atrial fibrillation (HCC)

## 2024-07-12 NOTE — PROCEDURES
PROCEDURE(S) PERFORMED:    1.    Cardioversion.  2.     Sedation     :  Vlad Beckham MD     ANESTHESIA:  IV sedation.     PRE-Op  Persistent atrial fibrillation.  POST-Op Sinus rhythm  COMPLICATIONS:  None.     METHODS:  The patient was brought to the outpatient cardiac telemetry unit in a fasting and nonsedated state after providing informed consent.  IV sedation was administered during continuous ECG, pulse oximeter and noninvasive hemodynamic monitoring.  After administering a total of 70 mg of IV brevital in intermittent boluses, the desired level of sedation was achieved.  A single 200-joule synchronized biphasic shock was delivered x2 with successful conversion to sinus rhythm and early recurrent AF. The patient remained on telemetry until she was fully recovered.  There were no complications.     CONCLUSIONS:  1.    Successful cardioversion with early recurrent AF.     ________________________________  SEAMUS BECKHAM MD

## 2024-07-12 NOTE — PLAN OF CARE
Assumed care of patient this am. Patient remains in afib/aflutter, rates 90's-130's. BP WDL. Ambulating in halls.   Rates pain 4-5/10, declines pain medication at this time.   Wife at bedside.  DCCV performed by DAYNA Umanzor, initial conversion to SR, with return to a-fib. Per Dr. Umanzor, give Cardizem, and if rates stabilize, ok for home today.   All discharge education provided to patient and wife, no further questions or needs at this time. Discharged home with wife.

## 2024-07-12 NOTE — PROGRESS NOTES
[facility]   part of [facility]     CV Surgery Progress Note    Dann Whitt Patient Status:  Inpatient    10/31/1970 MRN [id number]   Location [facility] 6NE-A Attending Skip Wei MD   Hosp Day # 3 PCP Chris Pollard MD     Subjective:  Patient reports feeling good, awaiting DCCV then ready to go home. Pain is controlled.     Tele: Afib/flutter    Objective:  BP (!) 135/108 (BP Location: Right arm)   Pulse 99   Temp 97.7 °F (36.5 °C) (Temporal)   Resp 12   Ht 6' 4\" (1.93 m)   Wt 249 lb 1.9 oz (113 kg)   SpO2 96%   BMI 30.32 kg/m²     Intake/Output:    Intake/Output Summary (Last 24 hours) at 2024 0915  Last data filed at 2024  Gross per 24 hour   Intake 1175 ml   Output --   Net 1175 ml       Labs:       Lab Results   Component Value Date     2024    K 3.8 2024     2024    CO2 26.0 2024    BUN 18 2024    CREATSERUM 0.93 2024     2024    CA 8.1 2024     Lab Results   Component Value Date    INR 1.57 (H) 2024       Physical Exam:  General: VSS, A&Ox3, In NAD  Neck: No JVD  Heart: S1,S2 irregularly irregular   Lungs: clear   Abdomen: Soft, non-tender  Extremities: Warm, dry, no edema  Skin: sub xiphoid incision C/D/I  Neuro: no focal deficits     Assessment/Plan:   S/P Left VATS, sub xiphoid posterior left atrial wall ablation, and ODALYS clip POD#3  -HD stable   -Chronic persistent Afib, rates generally controlled- tikosyn per EP, DCCV today  -AC, on xarelto   -Volume status stable    -Renal function intact, good UOP  -Bowel regimen   -Pain management, tylenol/prn oxycodone  -Colchicine and medrol dose pack for post-op inflammation   -Chest tubes removed   -DVT PPX: TEDs/SCDs  -Encourage IS/ambulation   -PT/OT/Cardiac rehab   -Ok discharge home later today from surgical standpoint if ok with other services      -D/W Dr. Jamison/Eriberto Bradley PA-C   Cardiothoracic Surgery    7/12/2024  9:18 AM

## 2024-07-15 ENCOUNTER — PATIENT OUTREACH (OUTPATIENT)
Dept: CASE MANAGEMENT | Age: 54
End: 2024-07-15

## 2024-07-15 NOTE — DISCHARGE SUMMARY
[facility]  Discharge Summary    Dann Whitt Patient Status:  Inpatient    10/31/1970 MRN [id number]   Location [facility] 6NE-A Attending No att. providers found   Hosp Day # 3 PCP Chris Pollard MD     Admit date: 2024    Discharge date and time: 2024  5:04 PM     Discharge Diagnoses:     Chronic persistent atrial fibrillation on xarelto  Hemorrhoids  History of cardioversion  History of COVID    Procedures Performed:     2024  1.) Transesophageal echocardiogram  2.) Sub xiphoid posterior left atrial wall ablation  3.) Left video assist thoracoscopy  4.) Ligament of Matt resection  5.) Left atrial appendage clip with size 40 mm Atriclip Pro2  6.) Ultrasound guided right CFV and left CFA line insertion  7.) Left intercostal nerve block using 0.25% bupivicane with epinephrine    2024   Direct synchronized cardioversion - converted briefly to sinus rhythm then back to atrial fibrillation         HPI & Hospital Course: Dann Whitt is a 53 year old male with history of chronic persistent atrial fibrillation who was admitted to the hospital following the above procedure on . Surgery was performed and recovery was uneventful. Patient remained hospitalized for monitoring during transition to Tikosyn for rhythm management and attempted cardioversion on 2024 from which he converted to sinus rhythm briefly then went back into atrial fibrillation. For more detailed information please see the medical record.     Discharge Condition: Stable     Discharge Instructions:  Pt was instructed not to lift anything heavy and to follow up as directed.     Signed:      Skip Wei MD

## 2024-07-23 ENCOUNTER — ORDER TRANSCRIPTION (OUTPATIENT)
Dept: ADMINISTRATIVE | Facility: HOSPITAL | Age: 54
End: 2024-07-23

## 2024-07-23 DIAGNOSIS — I48.91 ATRIAL FIBRILLATION (HCC): Primary | ICD-10-CM

## 2024-08-11 ENCOUNTER — PATIENT MESSAGE (OUTPATIENT)
Dept: FAMILY MEDICINE CLINIC | Facility: CLINIC | Age: 54
End: 2024-08-11

## 2024-08-11 DIAGNOSIS — N52.9 ERECTILE DYSFUNCTION, UNSPECIFIED ERECTILE DYSFUNCTION TYPE: ICD-10-CM

## 2024-08-12 RX ORDER — SILDENAFIL 100 MG/1
100 TABLET, FILM COATED ORAL
Qty: 30 TABLET | Refills: 0 | Status: SHIPPED | OUTPATIENT
Start: 2024-08-12

## 2024-08-12 NOTE — TELEPHONE ENCOUNTER
From: Dann Whitt  To: Chris Pollard  Sent: 8/11/2024 10:42 AM CDT  Subject: Refill if sildenafil     Hi, I was attempting to request a refill of sildenafil, but it is not appearing anywhere on my medication list anymore and it’s not appearing at my pharmacy either. Could you please approve a refill of this medication?

## 2024-08-12 NOTE — TELEPHONE ENCOUNTER
Medication refilled per protocol.     Requested Prescriptions     Signed Prescriptions Disp Refills    Sildenafil Citrate 100 MG Oral Tab 30 tablet 0     Sig: Take 1 tablet (100 mg total) by mouth daily as needed for Erectile Dysfunction.     Authorizing Provider: MARC MAXWELL     Ordering User: ESTRADA WANG 12/11/23    No future appointments.

## 2024-09-18 NOTE — PAT NURSING NOTE
PreOp Instructions     You are scheduled for: a Cardiac Procedure     Date of Procedure: 09/27/24     Diet Instructions: Do not eat or drink anything after midnight including gum, mints, candy, etc.     Medications: Medications you are allowed to take can be taken with a sip of water the morning of your procedure     Do not take the following Blood Thinner(s): hold xarelto day of procedure     Other Medications: no viagra or marijuana within 24 hours of procedure     Skin Prep: Shower with antibacterial soap using a clean washcloth, prior to procedure. Once dried off, no lotions/powders/creams/ointments, etc.     Arrival Time: The day prior to your procedure you will receive a phone call before 6:00 pm with your arrival time. If you haven't received a phone call, please check your voicemail messages., If you did not receive a voice mail and it is after 6:00 pm, please call the nursing supervisor at [phone].    Driving After Procedure: Sedation will be given so you WILL NOT be able to drive home. You will need a responsible adult  to drive you home. You can NOT take uber or taxi unless approved by your physician in advance.     Discharge Teaching: Your nurse will give you specific instructions before discharge, Any questions, please call the physician's office, Most people can resume normal activities in 2-3 days

## 2024-09-25 ENCOUNTER — ANESTHESIA EVENT (OUTPATIENT)
Dept: INTERVENTIONAL RADIOLOGY/VASCULAR | Facility: HOSPITAL | Age: 54
End: 2024-09-25
Payer: COMMERCIAL

## 2024-09-26 ENCOUNTER — ANESTHESIA (OUTPATIENT)
Dept: INTERVENTIONAL RADIOLOGY/VASCULAR | Facility: HOSPITAL | Age: 54
End: 2024-09-26
Payer: COMMERCIAL

## 2024-09-26 ENCOUNTER — HOSPITAL ENCOUNTER (OUTPATIENT)
Dept: INTERVENTIONAL RADIOLOGY/VASCULAR | Facility: HOSPITAL | Age: 54
Discharge: HOME | End: 2024-09-26
Attending: INTERNAL MEDICINE | Admitting: INTERNAL MEDICINE
Payer: COMMERCIAL

## 2024-09-26 VITALS
RESPIRATION RATE: 14 BRPM | SYSTOLIC BLOOD PRESSURE: 140 MMHG | HEART RATE: 61 BPM | TEMPERATURE: 98 F | DIASTOLIC BLOOD PRESSURE: 73 MMHG | OXYGEN SATURATION: 93 %

## 2024-09-26 DIAGNOSIS — I48.91 A-FIB (HCC): ICD-10-CM

## 2024-09-26 LAB
ANION GAP SERPL CALC-SCNC: 4 MMOL/L (ref 0–18)
BUN BLD-MCNC: 18 MG/DL (ref 9–23)
CALCIUM BLD-MCNC: 9.3 MG/DL (ref 8.7–10.4)
CHLORIDE SERPL-SCNC: 109 MMOL/L (ref 98–112)
CO2 SERPL-SCNC: 24 MMOL/L (ref 21–32)
CREAT BLD-MCNC: 1 MG/DL
EGFRCR SERPLBLD CKD-EPI 2021: 89 ML/MIN/1.73M2 (ref 60–?)
ERYTHROCYTE [DISTWIDTH] IN BLOOD BY AUTOMATED COUNT: 13 %
FASTING STATUS PATIENT QL REPORTED: YES
GLUCOSE BLD-MCNC: 89 MG/DL (ref 70–99)
HCT VFR BLD AUTO: 40.7 %
HGB BLD-MCNC: 14.3 G/DL
ISTAT ACTIVATED CLOTTING TIME: 226 SECONDS (ref 74–137)
ISTAT ACTIVATED CLOTTING TIME: 244 SECONDS (ref 74–137)
ISTAT ACTIVATED CLOTTING TIME: 281 SECONDS (ref 74–137)
ISTAT ACTIVATED CLOTTING TIME: 293 SECONDS (ref 74–137)
ISTAT ACTIVATED CLOTTING TIME: 299 SECONDS (ref 74–137)
ISTAT ACTIVATED CLOTTING TIME: 299 SECONDS (ref 74–137)
MCH RBC QN AUTO: 29.6 PG (ref 26–34)
MCHC RBC AUTO-ENTMCNC: 35.1 G/DL (ref 31–37)
MCV RBC AUTO: 84.3 FL
OSMOLALITY SERPL CALC.SUM OF ELEC: 285 MOSM/KG (ref 275–295)
PLATELET # BLD AUTO: 244 10(3)UL (ref 150–450)
POTASSIUM SERPL-SCNC: 3.8 MMOL/L (ref 3.5–5.1)
RBC # BLD AUTO: 4.83 X10(6)UL
SODIUM SERPL-SCNC: 137 MMOL/L (ref 136–145)
WBC # BLD AUTO: 5.8 X10(3) UL (ref 4–11)

## 2024-09-26 PROCEDURE — 80048 BASIC METABOLIC PNL TOTAL CA: CPT | Performed by: INTERNAL MEDICINE

## 2024-09-26 PROCEDURE — 4A0234Z MEASUREMENT OF CARDIAC ELECTRICAL ACTIVITY, PERCUTANEOUS APPROACH: ICD-10-PCS | Performed by: INTERNAL MEDICINE

## 2024-09-26 PROCEDURE — 85027 COMPLETE CBC AUTOMATED: CPT | Performed by: INTERNAL MEDICINE

## 2024-09-26 PROCEDURE — 85347 COAGULATION TIME ACTIVATED: CPT

## 2024-09-26 PROCEDURE — 93657 TX L/R ATRIAL FIB ADDL: CPT | Performed by: INTERNAL MEDICINE

## 2024-09-26 PROCEDURE — 93655 ICAR CATH ABLTJ DSCRT ARRHYT: CPT | Performed by: INTERNAL MEDICINE

## 2024-09-26 PROCEDURE — 93656 COMPRE EP EVAL ABLTJ ATR FIB: CPT | Performed by: INTERNAL MEDICINE

## 2024-09-26 PROCEDURE — 02583ZZ DESTRUCTION OF CONDUCTION MECHANISM, PERCUTANEOUS APPROACH: ICD-10-PCS | Performed by: INTERNAL MEDICINE

## 2024-09-26 PROCEDURE — 4A023FZ MEASUREMENT OF CARDIAC RHYTHM, PERCUTANEOUS APPROACH: ICD-10-PCS | Performed by: INTERNAL MEDICINE

## 2024-09-26 PROCEDURE — 02K83ZZ MAP CONDUCTION MECHANISM, PERCUTANEOUS APPROACH: ICD-10-PCS | Performed by: INTERNAL MEDICINE

## 2024-09-26 RX ORDER — HYDRALAZINE HYDROCHLORIDE 20 MG/ML
INJECTION INTRAMUSCULAR; INTRAVENOUS AS NEEDED
Status: DISCONTINUED | OUTPATIENT
Start: 2024-09-26 | End: 2024-09-26 | Stop reason: SURG

## 2024-09-26 RX ORDER — DEXAMETHASONE SODIUM PHOSPHATE 4 MG/ML
VIAL (ML) INJECTION AS NEEDED
Status: DISCONTINUED | OUTPATIENT
Start: 2024-09-26 | End: 2024-09-26 | Stop reason: SURG

## 2024-09-26 RX ORDER — NEOSTIGMINE METHYLSULFATE 1 MG/ML
INJECTION INTRAVENOUS AS NEEDED
Status: DISCONTINUED | OUTPATIENT
Start: 2024-09-26 | End: 2024-09-26 | Stop reason: SURG

## 2024-09-26 RX ORDER — METOPROLOL TARTRATE 1 MG/ML
2.5 INJECTION, SOLUTION INTRAVENOUS ONCE
Status: DISCONTINUED | OUTPATIENT
Start: 2024-09-26 | End: 2024-09-26

## 2024-09-26 RX ORDER — MIDAZOLAM HYDROCHLORIDE 1 MG/ML
1 INJECTION INTRAMUSCULAR; INTRAVENOUS EVERY 5 MIN PRN
Status: DISCONTINUED | OUTPATIENT
Start: 2024-09-26 | End: 2024-09-26

## 2024-09-26 RX ORDER — GLYCOPYRROLATE 0.2 MG/ML
INJECTION, SOLUTION INTRAMUSCULAR; INTRAVENOUS AS NEEDED
Status: DISCONTINUED | OUTPATIENT
Start: 2024-09-26 | End: 2024-09-26 | Stop reason: SURG

## 2024-09-26 RX ORDER — HEPARIN SODIUM 5000 [USP'U]/ML
INJECTION, SOLUTION INTRAVENOUS; SUBCUTANEOUS
Status: COMPLETED
Start: 2024-09-26 | End: 2024-09-26

## 2024-09-26 RX ORDER — LIDOCAINE HYDROCHLORIDE 10 MG/ML
INJECTION, SOLUTION EPIDURAL; INFILTRATION; INTRACAUDAL; PERINEURAL
Status: COMPLETED
Start: 2024-09-26 | End: 2024-09-26

## 2024-09-26 RX ORDER — EPHEDRINE SULFATE 50 MG/ML
INJECTION INTRAVENOUS AS NEEDED
Status: DISCONTINUED | OUTPATIENT
Start: 2024-09-26 | End: 2024-09-26 | Stop reason: SURG

## 2024-09-26 RX ORDER — SODIUM CHLORIDE, SODIUM LACTATE, POTASSIUM CHLORIDE, CALCIUM CHLORIDE 600; 310; 30; 20 MG/100ML; MG/100ML; MG/100ML; MG/100ML
INJECTION, SOLUTION INTRAVENOUS CONTINUOUS
Status: DISCONTINUED | OUTPATIENT
Start: 2024-09-26 | End: 2024-09-26

## 2024-09-26 RX ORDER — MEPERIDINE HYDROCHLORIDE 25 MG/ML
12.5 INJECTION INTRAMUSCULAR; INTRAVENOUS; SUBCUTANEOUS AS NEEDED
Status: DISCONTINUED | OUTPATIENT
Start: 2024-09-26 | End: 2024-09-26

## 2024-09-26 RX ORDER — HYDROMORPHONE HYDROCHLORIDE 1 MG/ML
0.4 INJECTION, SOLUTION INTRAMUSCULAR; INTRAVENOUS; SUBCUTANEOUS EVERY 5 MIN PRN
Status: DISCONTINUED | OUTPATIENT
Start: 2024-09-26 | End: 2024-09-26

## 2024-09-26 RX ORDER — PROTAMINE SULFATE 10 MG/ML
INJECTION, SOLUTION INTRAVENOUS AS NEEDED
Status: DISCONTINUED | OUTPATIENT
Start: 2024-09-26 | End: 2024-09-26 | Stop reason: SURG

## 2024-09-26 RX ORDER — HEPARIN SODIUM 5000 [USP'U]/ML
INJECTION, SOLUTION INTRAVENOUS; SUBCUTANEOUS AS NEEDED
Status: DISCONTINUED | OUTPATIENT
Start: 2024-09-26 | End: 2024-09-26 | Stop reason: SURG

## 2024-09-26 RX ORDER — NALOXONE HYDROCHLORIDE 0.4 MG/ML
80 INJECTION, SOLUTION INTRAMUSCULAR; INTRAVENOUS; SUBCUTANEOUS AS NEEDED
Status: DISCONTINUED | OUTPATIENT
Start: 2024-09-26 | End: 2024-09-26

## 2024-09-26 RX ORDER — HYDROMORPHONE HYDROCHLORIDE 1 MG/ML
0.2 INJECTION, SOLUTION INTRAMUSCULAR; INTRAVENOUS; SUBCUTANEOUS EVERY 5 MIN PRN
Status: DISCONTINUED | OUTPATIENT
Start: 2024-09-26 | End: 2024-09-26

## 2024-09-26 RX ORDER — ONDANSETRON 2 MG/ML
INJECTION INTRAMUSCULAR; INTRAVENOUS AS NEEDED
Status: DISCONTINUED | OUTPATIENT
Start: 2024-09-26 | End: 2024-09-26 | Stop reason: SURG

## 2024-09-26 RX ORDER — METOPROLOL SUCCINATE 50 MG/1
50 TABLET, EXTENDED RELEASE ORAL DAILY
Qty: 30 TABLET | Refills: 3 | Status: SHIPPED | OUTPATIENT
Start: 2024-09-26

## 2024-09-26 RX ORDER — HEPARIN SODIUM 1000 [USP'U]/ML
INJECTION, SOLUTION INTRAVENOUS; SUBCUTANEOUS
Status: COMPLETED
Start: 2024-09-26 | End: 2024-09-26

## 2024-09-26 RX ORDER — ROCURONIUM BROMIDE 10 MG/ML
INJECTION, SOLUTION INTRAVENOUS AS NEEDED
Status: DISCONTINUED | OUTPATIENT
Start: 2024-09-26 | End: 2024-09-26 | Stop reason: SURG

## 2024-09-26 RX ORDER — HYDROMORPHONE HYDROCHLORIDE 1 MG/ML
0.6 INJECTION, SOLUTION INTRAMUSCULAR; INTRAVENOUS; SUBCUTANEOUS EVERY 5 MIN PRN
Status: DISCONTINUED | OUTPATIENT
Start: 2024-09-26 | End: 2024-09-26

## 2024-09-26 RX ORDER — ONDANSETRON 2 MG/ML
4 INJECTION INTRAMUSCULAR; INTRAVENOUS EVERY 6 HOURS PRN
Status: DISCONTINUED | OUTPATIENT
Start: 2024-09-26 | End: 2024-09-26

## 2024-09-26 RX ORDER — SODIUM CHLORIDE 9 MG/ML
INJECTION, SOLUTION INTRAVENOUS CONTINUOUS
Status: DISCONTINUED | OUTPATIENT
Start: 2024-09-26 | End: 2024-09-26

## 2024-09-26 RX ORDER — LIDOCAINE HYDROCHLORIDE 10 MG/ML
INJECTION, SOLUTION EPIDURAL; INFILTRATION; INTRACAUDAL; PERINEURAL AS NEEDED
Status: DISCONTINUED | OUTPATIENT
Start: 2024-09-26 | End: 2024-09-26 | Stop reason: SURG

## 2024-09-26 RX ORDER — PROTAMINE SULFATE 10 MG/ML
INJECTION, SOLUTION INTRAVENOUS
Status: COMPLETED
Start: 2024-09-26 | End: 2024-09-26

## 2024-09-26 RX ORDER — SODIUM CHLORIDE 9 MG/ML
INJECTION, SOLUTION INTRAVENOUS
Status: COMPLETED | OUTPATIENT
Start: 2024-09-26 | End: 2024-09-26

## 2024-09-26 RX ORDER — HYDROCODONE BITARTRATE AND ACETAMINOPHEN 5; 325 MG/1; MG/1
2 TABLET ORAL ONCE AS NEEDED
Status: DISCONTINUED | OUTPATIENT
Start: 2024-09-26 | End: 2024-09-26

## 2024-09-26 RX ORDER — HYDROCODONE BITARTRATE AND ACETAMINOPHEN 5; 325 MG/1; MG/1
1 TABLET ORAL ONCE AS NEEDED
Status: DISCONTINUED | OUTPATIENT
Start: 2024-09-26 | End: 2024-09-26

## 2024-09-26 RX ORDER — SODIUM CHLORIDE 9 MG/ML
INJECTION, SOLUTION INTRAVENOUS CONTINUOUS PRN
Status: DISCONTINUED | OUTPATIENT
Start: 2024-09-26 | End: 2024-09-26 | Stop reason: SURG

## 2024-09-26 RX ORDER — MEPERIDINE HYDROCHLORIDE 25 MG/ML
INJECTION INTRAMUSCULAR; INTRAVENOUS; SUBCUTANEOUS
Status: COMPLETED
Start: 2024-09-26 | End: 2024-09-26

## 2024-09-26 RX ORDER — ACETAMINOPHEN 500 MG
1000 TABLET ORAL ONCE AS NEEDED
Status: DISCONTINUED | OUTPATIENT
Start: 2024-09-26 | End: 2024-09-26

## 2024-09-26 RX ORDER — PROCHLORPERAZINE EDISYLATE 5 MG/ML
5 INJECTION INTRAMUSCULAR; INTRAVENOUS EVERY 8 HOURS PRN
Status: DISCONTINUED | OUTPATIENT
Start: 2024-09-26 | End: 2024-09-26

## 2024-09-26 RX ORDER — DIPHENHYDRAMINE HYDROCHLORIDE 50 MG/ML
12.5 INJECTION INTRAMUSCULAR; INTRAVENOUS AS NEEDED
Status: DISCONTINUED | OUTPATIENT
Start: 2024-09-26 | End: 2024-09-26

## 2024-09-26 RX ADMIN — SODIUM CHLORIDE: 9 INJECTION, SOLUTION INTRAVENOUS at 11:25:00

## 2024-09-26 RX ADMIN — EPHEDRINE SULFATE 10 MG: 50 INJECTION INTRAVENOUS at 16:02:00

## 2024-09-26 RX ADMIN — ONDANSETRON 4 MG: 2 INJECTION INTRAMUSCULAR; INTRAVENOUS at 12:43:00

## 2024-09-26 RX ADMIN — HEPARIN SODIUM 2000 UNITS: 5000 INJECTION, SOLUTION INTRAVENOUS; SUBCUTANEOUS at 14:07:00

## 2024-09-26 RX ADMIN — DEXAMETHASONE SODIUM PHOSPHATE 8 MG: 4 MG/ML VIAL (ML) INJECTION at 12:41:00

## 2024-09-26 RX ADMIN — LIDOCAINE HYDROCHLORIDE 50 MG: 10 INJECTION, SOLUTION EPIDURAL; INFILTRATION; INTRACAUDAL; PERINEURAL at 12:31:00

## 2024-09-26 RX ADMIN — SODIUM CHLORIDE: 9 INJECTION, SOLUTION INTRAVENOUS at 12:27:00

## 2024-09-26 RX ADMIN — ROCURONIUM BROMIDE 20 MG: 10 INJECTION, SOLUTION INTRAVENOUS at 15:18:00

## 2024-09-26 RX ADMIN — ROCURONIUM BROMIDE 70 MG: 10 INJECTION, SOLUTION INTRAVENOUS at 12:31:00

## 2024-09-26 RX ADMIN — HEPARIN SODIUM 2000 UNITS: 5000 INJECTION, SOLUTION INTRAVENOUS; SUBCUTANEOUS at 14:59:00

## 2024-09-26 RX ADMIN — HEPARIN SODIUM 10000 UNITS: 5000 INJECTION, SOLUTION INTRAVENOUS; SUBCUTANEOUS at 13:01:00

## 2024-09-26 RX ADMIN — ROCURONIUM BROMIDE 20 MG: 10 INJECTION, SOLUTION INTRAVENOUS at 13:19:00

## 2024-09-26 RX ADMIN — NEOSTIGMINE METHYLSULFATE 4 MG: 1 INJECTION INTRAVENOUS at 16:02:00

## 2024-09-26 RX ADMIN — SODIUM CHLORIDE: 9 INJECTION, SOLUTION INTRAVENOUS at 16:27:00

## 2024-09-26 RX ADMIN — PROTAMINE SULFATE 50 MG: 10 INJECTION, SOLUTION INTRAVENOUS at 16:07:00

## 2024-09-26 RX ADMIN — ROCURONIUM BROMIDE 30 MG: 10 INJECTION, SOLUTION INTRAVENOUS at 13:59:00

## 2024-09-26 RX ADMIN — HEPARIN SODIUM 1000 UNITS: 5000 INJECTION, SOLUTION INTRAVENOUS; SUBCUTANEOUS at 14:28:00

## 2024-09-26 RX ADMIN — SODIUM CHLORIDE: 9 INJECTION, SOLUTION INTRAVENOUS at 14:51:00

## 2024-09-26 RX ADMIN — MEPERIDINE HYDROCHLORIDE 12.5 MG: 25 INJECTION INTRAMUSCULAR; INTRAVENOUS; SUBCUTANEOUS at 16:34:00

## 2024-09-26 RX ADMIN — ROCURONIUM BROMIDE 20 MG: 10 INJECTION, SOLUTION INTRAVENOUS at 14:36:00

## 2024-09-26 RX ADMIN — ROCURONIUM BROMIDE 10 MG: 10 INJECTION, SOLUTION INTRAVENOUS at 13:43:00

## 2024-09-26 RX ADMIN — GLYCOPYRROLATE 0.8 MG: 0.2 INJECTION, SOLUTION INTRAMUSCULAR; INTRAVENOUS at 16:02:00

## 2024-09-26 RX ADMIN — HEPARIN SODIUM 5000 UNITS: 5000 INJECTION, SOLUTION INTRAVENOUS; SUBCUTANEOUS at 13:50:00

## 2024-09-26 RX ADMIN — HYDRALAZINE HYDROCHLORIDE 10 MG: 20 INJECTION INTRAMUSCULAR; INTRAVENOUS at 15:29:00

## 2024-09-26 RX ADMIN — HEPARIN SODIUM 5000 UNITS: 5000 INJECTION, SOLUTION INTRAVENOUS; SUBCUTANEOUS at 13:33:00

## 2024-09-26 NOTE — ANESTHESIA PROCEDURE NOTES
Airway  Date/Time: 9/26/2024 12:33 PM  Urgency: elective      General Information and Staff    Patient location during procedure: OR  Anesthesiologist: Aldo Azevedo MD  Performed: anesthesiologist   Performed by: Aldo Azevedo MD  Authorized by: Aldo Azevedo MD      Indications and Patient Condition  Indications for airway management: anesthesia  Sedation level: deep  Preoxygenated: yes  Patient position: sniffing  Mask difficulty assessment: 1 - vent by mask    Final Airway Details  Final airway type: endotracheal airway      Successful airway: ETT  Cuffed: yes   Successful intubation technique: direct laryngoscopy  Endotracheal tube insertion site: oral  Blade: GlideScope  Blade size: #3  ETT size (mm): 7.5    Cormack-Lehane Classification: grade IIA - partial view of glottis  Placement verified by: capnometry   Measured from: lips  ETT to lips (cm): 24  Number of attempts at approach: 1    Additional Comments   OETT placed without airway or dental trauma.

## 2024-09-26 NOTE — DISCHARGE INSTRUCTIONS
HOME CARE INSTRUCTIONS FOLLOWING CARDIAC ABLATION (RFA) OR ELECTROPHYSIOLOGIC STUDY (EPS)    Activity:  - DO NOT drive after the procedure. You may resume driving late the following day according to the nurse or physician's instructions.  - Plan on resting and relaxing tonight and tomorrow. It will be normal to tire easily for the first few days, depending on the length of the procedure and the amount of sedation you received.   - DO NOT lift anything over 10 pounds for the next 24 hours.  - Avoid sexual activity for the next 24 hours.  - Avoid repeated stair use and excessive walking for the next 24 hours.   - Avoid drinking alcohol for the next 24 hours.  - Resume your normal activity after 48 hours, or as instructed by your physician.    What is Normal:  - You may feel extra heart beats. If these beats come too often or you feel an episode of multiple fast heartbeats, notify your physician.  - The procedure site may appear bruised or discolored.  - There may be a small amount of drainage on the bandage  - There may be mild tenderness to the procedure site when touched, which is common.     Special Instructions:  - The bandage is to remain in place for 24 hours. Keep the bandage clean and dry. Do not submerge the site for 72 hours (no tub, baths or pools).  - After 24 hours you must remove the bandage. Wash the procedure site gently with soap and water. If you choose to wear a bandaid for a few days, make sure it remains clean and dry and that it is changed daily.  - The day after the procedure you may shower after you remove your dressing (but not baths).    When you should NOTIFY YOUR PHYSICIAN:  - If you have shortness of breath or a persistent cough  - If you have chest pain (angina)  - If you have persistent pain at the procedure site  - If you experience a fever with a temperature >101 degrees, chills, infection (redness, swelling, thick yellow drainage, or a foul odor from procedure site)    Other:  - You  may resume your present diet, unless otherwise directed by your physician  - You may resume all of your medications as prescribed, unless otherwise directed by your physician. A list of your medications was provided to you at discharge.  - Do not make any personal/business decisions and/or sign any legal documents for the next 24 hours.

## 2024-09-26 NOTE — ANESTHESIA PREPROCEDURE EVALUATION
PRE-OP EVALUATION    Patient Name: Dann Whitt    Admit Diagnosis: A-fib (HCC) [I48.91]    Pre-op Diagnosis: * No surgery found *        Anesthesia Procedure: CATH EP  cryablation of atrial fibrillation  * Surgery not found *    Pre-op vitals reviewed.  Temp: 96.8 °F (36 °C)  Pulse: 51  Resp: 19  BP: 144/84  SpO2: 96 %  There is no height or weight on file to calculate BMI.    Current medications reviewed.  Hospital Medications:   [COMPLETED] sodium chloride 0.9% infusion   Intravenous On Call    [COMPLETED] heparin (Porcine) 5000 UNIT/ML injection        [COMPLETED] lidocaine PF (Xylocaine-MPF) 1 % injection        [COMPLETED] heparin (Porcine) 1000 UNIT/ML injection           Outpatient Medications:     Medications Prior to Admission   Medication Sig Dispense Refill Last Dose    metoprolol succinate ER 50 MG Oral Tablet 24 Hr Take 1 tablet (50 mg total) by mouth 2x Daily(Beta Blocker). 60 tablet 3 9/26/2024 at 0900    dilTIAZem 120 MG Oral Tab Take 1 tablet (120 mg total) by mouth in the morning and 1 tablet (120 mg total) before bedtime. 60 tablet 3 9/26/2024 at 0900    dofetilide (TIKOSYN) 250 MCG Oral Cap Take 1 capsule (250 mcg total) by mouth 2 (two) times daily. 60 capsule 1 9/26/2024 at 0900    rivaroxaban (XARELTO) 20 MG Oral Tab Take 1 tablet (20 mg total) by mouth daily with food.   9/25/2024 at 2100    Sildenafil Citrate 100 MG Oral Tab Take 1 tablet (100 mg total) by mouth daily as needed for Erectile Dysfunction. 30 tablet 0        Allergies: Patient has no known allergies.      Anesthesia Evaluation    Patient summary reviewed.    Anesthetic Complications  (-) history of anesthetic complications         GI/Hepatic/Renal                                 Cardiovascular    Negative cardiovascular ROS.    Exercise tolerance: good     MET: >4      (+) hypertension and well controlled                                    Endo/Other                                  Pulmonary                            Neuro/Psych                              As per Epic:  Patient Active Problem List:     Pneumonia, organism unspecified(486)     Unspecified sinusitis (chronic)     Sciatica     Persistent atrial fibrillation (HCC)          Past Surgical History:   Procedure Laterality Date    Anesth,knee joint,closed procedure Right 2003    bursa removed    Colonoscopy      Hand/finger surgery unlisted  01/01/1989    left bone graft (pinky finger)    Other surgical history  11/01/2001    corneal lasik    Other surgical history  07/09/2024    CONVERGENT PROCEDURE     Social History     Socioeconomic History    Marital status:    Tobacco Use    Smoking status: Former     Types: Cigarettes    Smokeless tobacco: Never    Tobacco comments:     6 cigs/daily   Vaping Use    Vaping status: Never Used   Substance and Sexual Activity    Alcohol use: Yes     Comment: 1X WEEK    Drug use: Yes     Types: Cannabis     Comment: rare   Other Topics Concern    Caffeine Concern Yes    Exercise Yes     History   Drug Use    Types: Cannabis     Comment: rare     Available pre-op labs reviewed.  Lab Results   Component Value Date    WBC 13.7 (H) 07/10/2024    RBC 4.72 07/10/2024    HGB 14.4 07/10/2024    HCT 40.1 07/10/2024    MCV 85.0 07/10/2024    MCH 30.5 07/10/2024    MCHC 35.9 07/10/2024    RDW 12.6 07/10/2024    .0 07/10/2024     Lab Results   Component Value Date     09/26/2024    K 3.8 09/26/2024     09/26/2024    CO2 24.0 09/26/2024    BUN 18 09/26/2024    CREATSERUM 1.00 09/26/2024    GLU 89 09/26/2024    CA 9.3 09/26/2024            Airway      Mallampati: II  Mouth opening: >3 FB  TM distance: > 6 cm  Neck ROM: full Cardiovascular      Rhythm: regular  Rate: normal  (-) murmur   Dental  Comment: Dentition is grossly intact;  Patient does not demonstrate loose teeth to inspection.           Pulmonary  Comment: Unlabored ventilatory effort, no retractions.  Pulmonary exam normal.  Breath sounds clear to auscultation  bilaterally.               Other findings              ASA: 3   Plan: general  NPO status verified and patient meets guidelines.        Comment: I explained intrinsic risks of general anesthesia, including nausea, dental damage, sore throat, mouth injury,and hoarseness from airway management.  All questions were answered and understanding was demonstrated of risks.  Informed permission was obtained to proceed as documented in the signed consent form.      Patient overall has increased anesthesia risks secondary to current medical conditions that qualify for ASA status III.  As documented in the informed consent, risks have been accepted.    Plan/risks discussed with: patient and spouse  Use of blood product(s) discussed with: patient and spouse    Consented to blood products.          Present on Admission:  **None**

## 2024-09-26 NOTE — ANESTHESIA PROCEDURE NOTES
Arterial Line    Date/Time: 9/26/2024 12:35 PM    Performed by: Aldo Azevedo MD  Authorized by: Aldo Azevedo MD    General Information and Staff    Procedure Start:  9/26/2024 12:35 PM  Procedure End:  9/26/2024 12:39 PM  Anesthesiologist:  Aldo Azevedo MD  Performed By:  Anesthesiologist  Patient Location:  OR  Indication: continuous blood pressure monitoring and blood sampling needed    Site Identification: surface landmarks    Preanesthetic Checklist: 2 patient identifiers, IV checked, risks and benefits discussed, monitors and equipment checked, pre-op evaluation, timeout performed, anesthesia consent and sterile technique used    Procedure Details    Catheter Size:  20 G  Catheter Length:  1 and 3/4 inch  Catheter Type:  Arrow  Seldinger Technique?: Yes    Laterality:  Left  Site:  Radial artery  Site Prep: chlorhexidine    Line Secured:  Tegaderm    Assessment    Events: patient tolerated procedure well with no complications      Medications  9/26/2024 12:35 PM      Additional Comments    Uneventful placement after induction general anesthesia.

## 2024-09-26 NOTE — ANESTHESIA POSTPROCEDURE EVALUATION
[facility]    Dann Whitt Patient Status:  Outpatient   Age/Gender 53 year old male MRN [id number]   Location [facility] INTERVENTIONAL SUITES Attending Vlad Umanzor MD   Hosp Day # 0 PCP Chris Pollard MD       Anesthesia Post-op Note        Procedure Summary       Date: 09/26/24 Room / Location: [facility] Interventional Suites    Anesthesia Start: 1222 Anesthesia Stop: 1627    Procedure: CATH EP Diagnosis:       A-fib (HCC)      A-fib (HCC)    Scheduled Providers: Aldo Azevedo MD Anesthesiologist: Aldo Azevedo MD    Anesthesia Type: general ASA Status: 3            Anesthesia Type: general    Vitals Value Taken Time   /80 09/26/24 1627   Temp 96.9 °F (36.1 °C) 09/26/24 1627   Pulse 68 09/26/24 1627   Resp 18 09/26/24 1627   SpO2 90 % 09/26/24 1627       Patient Location: PACU    Anesthesia Type: general    Airway Patency: patent    Postop Pain Control: adequate    Mental Status: mildly sedated but able to meaningfully participate in the post-anesthesia evaluation    Nausea/Vomiting: none    Cardiopulmonary/Hydration status: stable euvolemic    Complications: no apparent anesthesia related complications    Postop vital signs: stable    Comments: The endotracheal airway was removed in the procedure area.  Cable monitors were removed, and the patient was transported with observation to the recovery area personally with the OR team.  The patient was responsive in a meaningful way and demonstrated a good airway.  PACU monitors were then applied with device connection to Epic.  Full report signout, including report, identifications, history, procedure, anesthesia course, recovery expectations with chance for questions was provided to a responsible recovery RN.        Dental Exam: Unchanged from Preop    Patient to be discharged from PACU when criteria met.        report to LIBRA Haider

## 2024-09-26 NOTE — PROCEDURES
Procedures performed:  1. Comprehensive EP study with 3-D mapping using CARTO  2. CS catheter placement to record and pace the left atrium.   3. RFA of atrial fibrillation with pulmonary vein isolation, posterior wall isolation.   4. RFA of a separate and distinct arrhythmia, atrial flutter.   5. Intra-cardiac echo (ICE)    : Vlad Umanzor MD    Pre-Op: persistent atrial fibrillation.   Post-Op: sinus rhythm    Complication: none    Methods: The patient was brought to the EP lab in a fasting state and non-sedated state. The right and left groins were prepped and draped in a sterile fashion. 3 sheaths were placed in the right femoral amrik via the modified Seldinger technique. Catheters were placed in the appropriate position under ICE and 3D mapping. Baseline measurements were recorded and programmed stimulation from the atrium and ventricle was performed. At the conclusion of the procedure, catheters and sheaths were removed and hemostasis was achieved with Vascade closure devices. There were no apparent intra-operative complications no pericardial effusion visualized by ICE. An attune cooling balloon catheter was placed in the esophagus.The patient was transported in stable condition to recovery.    Mapping and ablation: A heparin bolus was given, intermittent boluses were subsequently given to maintain an ACT of at least 300 seconds. There was no effusion at baseline. The ODALYS was visualized with ICE, there did not appear to be a thrombus in the ODALYS. Transseptal (TS) access was achieved with ICE guidance. The TS sheath was exchanged for a steerable sheath.    Using the CARTO mapping system and ICE, a multi-electrode catheter was used to create a 3D geometry of the LA. Using an open irrigation RFA catheter, RFA was performed using high power short duration to achieve pulmonary vein isolation (PVI). PVI was confirmed with differential pacing.      Posterior wall isolation was performed and confirmed with  differential pacing.     Catheters were withdrawn to the RA. A linear set of lesions was delivered in the cavo-tricuspid isthmus until bi-directional block was confirmed.     Post ablation findings:   SCL 1244 ms,  ms, QRS 98 ms,  ms.     AH 72 ms, HV 45 ms.      CONCLUSIONS:  1. Sinus node function normal  2. AV node function normal.  3. His Purkinge normal  4. Status post successful pulmonary vein isolation.   5. Status post successful LA posterior wall isolation.   6. No pericardial effusion visualized by ICE.  7. Status post successful ablation for typical cavo-tricuspid isthmus dependent atrial flutter. Bidirectional block was confirmed as described above. Atrial flutter was no longer inducible following RFA as described above.    Vlad Umanzor MD  [facility]  Cardiac Electrophysiolgy  [phone]

## 2024-09-27 ENCOUNTER — HOSPITAL ENCOUNTER (OUTPATIENT)
Dept: INTERVENTIONAL RADIOLOGY/VASCULAR | Facility: HOSPITAL | Age: 54
Discharge: HOME | End: 2024-09-27
Attending: INTERNAL MEDICINE
Payer: COMMERCIAL

## 2024-09-27 DIAGNOSIS — I48.19 PERSISTENT ATRIAL FIBRILLATION (HCC): Primary | ICD-10-CM

## 2024-09-27 NOTE — PLAN OF CARE
Received patient from PACU @1730, s/p ablation. Right groin site soft, CDI, no hematoma. VSS. Patient denies any pain. Patient's wife @ bedside. Patient tolerating po intake. Patient ambulated to BR and in st, tolerated well. Groin stable. Recovery time completed. Discharge instructions reviewed. IV D/C'd. Patient dsicahrged to [facility] by wheelchair with belongings. Patient's wife is .

## 2024-10-24 DIAGNOSIS — Z12.11 SCREENING FOR COLON CANCER: Primary | ICD-10-CM

## 2025-01-21 ENCOUNTER — LAB ENCOUNTER (OUTPATIENT)
Dept: LAB | Age: 55
End: 2025-01-21
Attending: FAMILY MEDICINE
Payer: COMMERCIAL

## 2025-02-14 DIAGNOSIS — N52.9 ERECTILE DYSFUNCTION, UNSPECIFIED ERECTILE DYSFUNCTION TYPE: ICD-10-CM

## 2025-02-15 RX ORDER — SILDENAFIL 100 MG/1
100 TABLET, FILM COATED ORAL DAILY PRN
Qty: 8 TABLET | Refills: 3 | Status: SHIPPED | OUTPATIENT
Start: 2025-02-15

## 2025-02-15 NOTE — TELEPHONE ENCOUNTER
Failed protocol    Requested Prescriptions     Pending Prescriptions Disp Refills    SILDENAFIL CITRATE 100 MG Oral Tab [Pharmacy Med Name: SILDENAFIL 100 MG TABLET] 8 tablet 3     Sig: TAKE 1 TABLET BY MOUTH EVERY DAY AS NEEDED FOR ERECTILE DYSFUNCTION        Last refill: 8/12/24 30 tabs 0 refills    Last Appointment: LOV Visit date not found     Next Appointment: Visit date not found

## 2025-04-25 PROBLEM — I48.91 ATRIAL FIBRILLATION WITH RVR (HCC): Status: ACTIVE | Noted: 2023-12-11

## 2025-04-25 PROBLEM — R00.0 RACING HEART BEAT: Status: ACTIVE | Noted: 2023-12-15

## 2025-08-11 ENCOUNTER — TELEPHONE (OUTPATIENT)
Dept: FAMILY MEDICINE CLINIC | Facility: CLINIC | Age: 55
End: 2025-08-11

## 2025-08-11 DIAGNOSIS — Z12.11 ENCOUNTER FOR SCREENING COLONOSCOPY: ICD-10-CM

## 2025-08-11 DIAGNOSIS — R00.0 RACING HEART BEAT: ICD-10-CM

## 2025-08-11 DIAGNOSIS — I48.91 ATRIAL FIBRILLATION WITH RVR (HCC): Primary | ICD-10-CM

## 2025-08-11 DIAGNOSIS — Z86.0100 HX OF COLONIC POLYPS: ICD-10-CM

## 2025-08-13 ENCOUNTER — TELEPHONE (OUTPATIENT)
Dept: CASE MANAGEMENT | Age: 55
End: 2025-08-13

## 2025-08-19 ENCOUNTER — TELEPHONE (OUTPATIENT)
Dept: FAMILY MEDICINE CLINIC | Facility: CLINIC | Age: 55
End: 2025-08-19

## (undated) DEVICE — SUT PERMA- 0 18IN FSL NABSRB BLK L30MM 3/8

## (undated) DEVICE — SUT PERMA- 2-0 60IN N ABSRB BLK TIE

## (undated) DEVICE — 4-WAY HIGH FLOW STOPCOCK W/ROTATING LUER: Brand: ICU MEDICAL

## (undated) DEVICE — GLOVE SUR 7 BIOGEL PI ORTHOPRO PIP BRN PWD F

## (undated) DEVICE — Device

## (undated) DEVICE — VIOLET BRAIDED (POLYGLACTIN 910), SYNTHETIC ABSORBABLE SUTURE: Brand: COATED VICRYL

## (undated) DEVICE — COVER,TABLE,44X90,STERILE: Brand: MEDLINE

## (undated) DEVICE — BULB SYRINGE,IRRIGATION WITH PROTECTIVE CAP: Brand: DOVER

## (undated) DEVICE — SUT PERMA- 0 30IN FSL NABSRB BLK 30MM 3/8

## (undated) DEVICE — ADHESIVE SKIN TOP FOR WND CLSR DERMBND ADV

## (undated) DEVICE — GAUZE,SPONGE,4"X4",12PLY,STERILE,LF,2'S: Brand: MEDLINE

## (undated) DEVICE — SPONGE GZ 4X4IN COT 12 PLY TYP VII WVN C

## (undated) DEVICE — ELECTRODE ES L10.2CM BLDE L4IN EXT MPLR OPN

## (undated) DEVICE — [HIGH FLOW INSUFFLATOR,  DO NOT USE IF PACKAGE IS DAMAGED,  KEEP DRY,  KEEP AWAY FROM SUNLIGHT,  PROTECT FROM HEAT AND RADIOACTIVE SOURCES.]: Brand: PNEUMOSURE

## (undated) DEVICE — PENCIL ES CRD L15FT MPLR BLDE HOLSTER

## (undated) DEVICE — SUT VCRL 3-0 27IN PS-1 ABSRB UD L24MM 3/8 CIR

## (undated) DEVICE — 3M™ BAIR HUGGER® UNDERBODY BLANKET, FULL ACCESS, 10 PER CASE 63500: Brand: BAIR HUGGER™

## (undated) DEVICE — 3M™ STERI-STRIP™ REINFORCED ADHESIVE SKIN CLOSURES, R1547, 1/2 IN X 4 IN (12 MM X 100 MM), 6 STRIPS/ENVELOPE: Brand: 3M™ STERI-STRIP™

## (undated) DEVICE — SUT VCRL 2-0 36IN CT-1 ABSRB UD L36MM 1/2 CIR

## (undated) DEVICE — SYRINGE MED 30ML STD CLR PLAS LL TIP N CTRL

## (undated) DEVICE — MARYLAND JAW LAPAROSCOPIC SEALER/DIVIDER COATED: Brand: LIGASURE

## (undated) DEVICE — SOLUTION IRRIG 1000ML 0.9% NACL USP BTL

## (undated) DEVICE — TRAY CATH 16FR F INCL BARDX IC COMPLT CARE

## (undated) DEVICE — TROCAR: Brand: KII® SLEEVE

## (undated) DEVICE — ZZ- DISC- NOSUB-SOLUTION RUBBING 4OZ 70% ISO ALC CLR

## (undated) DEVICE — TROCAR: Brand: KII FIOS FIRST ENTRY

## (undated) DEVICE — ENDOPATH 5MM ENDOSCOPIC BLUNT TIP DISSECTORS (12 POUCHES CONTAINING 3 DISSECTORS EACH): Brand: ENDOPATH

## (undated) DEVICE — STANDARD HYPODERMIC NEEDLE,POLYPROPYLENE HUB: Brand: MONOJECT

## (undated) DEVICE — SUT VCRL 0 L27IN ABSRB VLT L26MM UR-6 5/8-ZZDISC-USE 421016

## (undated) DEVICE — PML 48 ADULT FLL-MLL PG: Brand: NAMIC

## (undated) DEVICE — 3M™ DEFIBRULATOR PADS 2346N: Brand: 3M™

## (undated) DEVICE — ESOPHAGEAL STETHOSCOPE W/TEMPERATURE SENSOR: Brand: DEROYAL

## (undated) DEVICE — SOLUTION IV 500ML 0.9% NACL FLX CONT

## (undated) DEVICE — KENDALL SCD EXPRESS SLEEVES, KNEE LENGTH, MEDIUM: Brand: KENDALL SCD

## (undated) DEVICE — SUT POLYDEK 2-0 75CM NABSRB GRN

## (undated) DEVICE — KIT,ANTI FOG,W/SPONGE & FLUID,SOFT PACK: Brand: MEDLINE

## (undated) DEVICE — CELL SAVER RESERVOIR

## (undated) DEVICE — SOLUTION PREP 26ML 0.7% POVACRYLEX 74% ISO

## (undated) DEVICE — TUBING PRESSURE F/INJECT 72IN

## (undated) DEVICE — 1010 S-DRAPE TOWEL DRAPE 10/BX: Brand: STERI-DRAPE™

## (undated) DEVICE — REM POLYHESIVE ADULT PATIENT RETURN ELECTRODE: Brand: VALLEYLAB

## (undated) DEVICE — CV PACK-LF: Brand: MEDLINE INDUSTRIES, INC.

## (undated) DEVICE — BLADE ELECTRODE: Brand: EDGE

## (undated) DEVICE — LACTATED R INJ

## (undated) DEVICE — 3M™ TEGADERM™ TRANSPARENT FILM DRESSING FRAME STYLE, 1626W, 4 IN X 4-3/4 IN (10 CM X 12 CM), 50/CT 4CT/CASE: Brand: 3M™ TEGADERM™

## (undated) DEVICE — PROVE COVER: Brand: UNBRANDED

## (undated) DEVICE — STERILE POLYISOPRENE POWDER-FREE SURGICAL GLOVES: Brand: PROTEXIS

## (undated) DEVICE — SYRINGE MED 10ML LL TIP W/O SFTY DISP

## (undated) DEVICE — TRADITIONAL MARYLAND DISSECTOR TIP, DISPOSABLE: Brand: RENEW

## (undated) DEVICE — PRESSURE INFUSOR: Brand: VITAL SIGNS™

## (undated) DEVICE — INSULATED BLADE ELECTRODE;CAUTION: FOR MANUFACTURING, PROCESSING, OR REPACKING.: Brand: EDGE

## (undated) DEVICE — MEDI-VAC NON-CONDUCTIVE SUCTION TUBING: Brand: CARDINAL HEALTH

## (undated) NOTE — LETTER
Kvng Ortega M.D., F.A.C.S.  Parrish Laughlin M.D., F.A.C.S.  Pedrito Casillas M.D., F.A.C.S  Tone Bonds M.D., F.A.C.S.   Parag Osullivan M.D., F.A.C.S.   Wes Archuleta M.D.  Fouzia Green M.D., F.A.C.S.  Farhad Boyle M.D., F.A.C.S.  Farhad Ruiz M.D., F.A.C.SDouglas Mortensen M.D., F.A.C.S.  Farhad Elias M.D. F.A.C.S.  Antonio Jamison M.D., F.A.C.S.   Rigo Durand M.D., F.A.C.S.  Bobby Lawrence M.D., F.A.C.S., F.A.C.C. Holger Elmore M.D., F.A.C.S.   Juan M Peace M.D., F.A.C.S.  Holger Fernández M.D., F.A.C.S.  Girma Ellison M.D., F.A.C.S.  CARMENCITA Meza M.D., F.A.C.S  CARMENCITA Bruner M.D., F.A.C.S.   Bunny Beck M.D., F.A.C.S.  CARMENCITA Centeno M.D. R. Anthony Perez-Tamayo, M.D. PhD.  CARMENCITA Quintanilla M.D. F A ANDREW Abbott M.D.   Danya Santillan M.D.   Tremaine Maurice M.D.  CARMENCITA Terry D.O., F.A.CDouglasS.  Juan M Gamboa M.D., F.A.C.S.  Girma Valadez M.D.        Welcome to [facility]    As you contemplate possible surgical treatment, it is very important to us that you understand fully what is being discussed, that all of your questions have been answered, and that your options for treatment have been fully explained.    To that end, on the following page we will ask you some questions to make certain that you understand everything which has been explained to you. Included in this understanding is that there are both surgical and nonsurgical treatments available for you, that you have options regarding where your care is given, and what doctors are involved in your care. Included in these options would, of course, be the option to elect for no treatment whatsoever. We especially want to be sure that you have had a chance to have all of your questions and  concerns answered. If there are any issues which have not been adequately addressed, we ask you to bring them forward so that we can thoroughly address them.    A patient who is fully informed and understands their condition and options for treatment, as well as potential adverse effects of treatment, is going to be a patient who receives the most benefit out of care rendered. Our goal in addition to providing excellent surgical care is to provide the necessary information to you and your family in order to make decisions which are appropriate relative to your own care.    Please take the time necessary to read and answer the questions on the next page. Again, if you have any questions, bring them forward and we will certainly address them.    Sincerely,    [facility].    _______________________  ____________________________________  Date:                                             Patient Signature  ________________________  Dann Mcguiremarco  Witness Consent Form         Revised: 2015  Patient Name: Dann Whitt     : 10/31/1970                 Printed: 6/15/13 9:43 AM     Medical Record #: [id number]                Page: 1 of  2        [facility]  Supplemental Consent Form    A [facility] surgeon has met with me and explained the matter of my illness, and what treatments might be available to improve my condition. As a result of that conversation, I understand the following:    A [facility] surgeon met with me and explained, in detail, the nature of my condition for which surgery is being contemplated. The procedure to be performed  Is: CONVERGENT PROCEDURE            Yes _____ No _____    A [facility] surgeon has explained to me that there are alternatives to surgery which might include no surgery, medical therapy, or interventional treatment, among other options and the risks and benefits of the different treatment options:    Yes _____ No  _____    A [facility] surgeon as explained to me that if I should so desire, he/she is willing to explain my case and the surgical and non-surgical options to family members:            Yes _____ No _____    A [facility] surgeon has answered all of my questions regarding the topics we have discussed. I have been invited to ask more questions:  Yes _____ No _____    A [facility] surgeon has explained to me that if I seek other options or wish treatment at another facility in [place] or [place], or anywhere in the [place], that his/her office will assist me in making such accommodations:   Yes _____ No _____    A [facility] surgeon has explained to me, that death, risk of bleeding, stroke, multi-organ failure, heart attack or other complications are risks for the proposed surgical procedure:           Yes _____ No _____    A [facility] surgeon has explained to me that I have the right to cancel or postpone the surgery at any time prior to the start of surgery:    Yes _____ No _____    The nature and options for treatment for my condition have been explained to me, in detail, by a [facility] surgeon and all questions have been answered to my satisfaction. I understand that I am not required to undergo surgery, and further, that if I so desire, I could have surgery accomplished by another surgeon or at another institution. I understand and accept that which has been explained to me. I am able to make my decisions knowingly and willfully based on the data.    ______________________   __________________________________  Date       Patient Signature  ______________________________ Dann TERRELL Peri  Witness Consent Form   Patient Name: Dann Whitt     DATB: 10/31/1970                 Medical Record #: [id number]    Page: 2 of 2        Printed: June 25, 2024